# Patient Record
Sex: FEMALE | Race: WHITE | Employment: FULL TIME | URBAN - METROPOLITAN AREA
[De-identification: names, ages, dates, MRNs, and addresses within clinical notes are randomized per-mention and may not be internally consistent; named-entity substitution may affect disease eponyms.]

---

## 2018-01-19 ENCOUNTER — HOSPITAL ENCOUNTER (OUTPATIENT)
Dept: MAMMOGRAPHY | Age: 58
Discharge: HOME | End: 2018-01-19
Attending: INTERNAL MEDICINE
Payer: OTHER GOVERNMENT

## 2018-01-19 DIAGNOSIS — Z12.31 VISIT FOR SCREENING MAMMOGRAM: ICD-10-CM

## 2018-01-19 PROCEDURE — 77067 SCR MAMMO BI INCL CAD: CPT

## 2018-10-30 ENCOUNTER — APPOINTMENT (OUTPATIENT)
Dept: CT IMAGING | Age: 58
End: 2018-10-30
Attending: EMERGENCY MEDICINE
Payer: OTHER GOVERNMENT

## 2018-10-30 ENCOUNTER — HOSPITAL ENCOUNTER (EMERGENCY)
Age: 58
Discharge: HOME | End: 2018-10-30
Attending: EMERGENCY MEDICINE
Payer: OTHER GOVERNMENT

## 2018-10-30 VITALS
WEIGHT: 153.22 LBS | RESPIRATION RATE: 16 BRPM | SYSTOLIC BLOOD PRESSURE: 140 MMHG | DIASTOLIC BLOOD PRESSURE: 72 MMHG | HEART RATE: 60 BPM | HEIGHT: 65 IN | TEMPERATURE: 98 F | OXYGEN SATURATION: 97 % | BODY MASS INDEX: 25.53 KG/M2

## 2018-10-30 DIAGNOSIS — R42 VERTIGO: Primary | ICD-10-CM

## 2018-10-30 LAB
ALBUMIN SERPL-MCNC: 3.9 G/DL (ref 3.5–5)
ALBUMIN/GLOB SERPL: 1.1 {RATIO} (ref 1.1–2.2)
ALP SERPL-CCNC: 92 U/L (ref 45–117)
ALT SERPL-CCNC: 24 U/L (ref 12–78)
ANION GAP SERPL CALC-SCNC: 12 MMOL/L (ref 5–15)
AST SERPL-CCNC: 20 U/L (ref 15–37)
ATRIAL RATE: 60 BPM
BASOPHILS # BLD: 0 K/UL (ref 0–0.1)
BASOPHILS NFR BLD: 1 % (ref 0–1)
BILIRUB SERPL-MCNC: 1 MG/DL (ref 0.2–1)
BUN SERPL-MCNC: 18 MG/DL (ref 6–20)
BUN/CREAT SERPL: 24 (ref 12–20)
CALCIUM SERPL-MCNC: 9.2 MG/DL (ref 8.5–10.1)
CALCULATED P AXIS, ECG09: 31 DEGREES
CALCULATED R AXIS, ECG10: 27 DEGREES
CALCULATED T AXIS, ECG11: 25 DEGREES
CHLORIDE SERPL-SCNC: 107 MMOL/L (ref 97–108)
CO2 SERPL-SCNC: 22 MMOL/L (ref 21–32)
COMMENT, HOLDF: NORMAL
CREAT SERPL-MCNC: 0.75 MG/DL (ref 0.55–1.02)
DIAGNOSIS, 93000: NORMAL
DIFFERENTIAL METHOD BLD: NORMAL
EOSINOPHIL # BLD: 0.2 K/UL (ref 0–0.4)
EOSINOPHIL NFR BLD: 4 % (ref 0–7)
ERYTHROCYTE [DISTWIDTH] IN BLOOD BY AUTOMATED COUNT: 11.9 % (ref 11.5–14.5)
GLOBULIN SER CALC-MCNC: 3.7 G/DL (ref 2–4)
GLUCOSE SERPL-MCNC: 143 MG/DL (ref 65–100)
HCT VFR BLD AUTO: 38.1 % (ref 35–47)
HGB BLD-MCNC: 12.4 G/DL (ref 11.5–16)
IMM GRANULOCYTES # BLD: 0 K/UL (ref 0–0.04)
IMM GRANULOCYTES NFR BLD AUTO: 0 % (ref 0–0.5)
LYMPHOCYTES # BLD: 1.6 K/UL (ref 0.8–3.5)
LYMPHOCYTES NFR BLD: 32 % (ref 12–49)
MCH RBC QN AUTO: 31.2 PG (ref 26–34)
MCHC RBC AUTO-ENTMCNC: 32.5 G/DL (ref 30–36.5)
MCV RBC AUTO: 96 FL (ref 80–99)
MONOCYTES # BLD: 0.5 K/UL (ref 0–1)
MONOCYTES NFR BLD: 10 % (ref 5–13)
NEUTS SEG # BLD: 2.8 K/UL (ref 1.8–8)
NEUTS SEG NFR BLD: 54 % (ref 32–75)
NRBC # BLD: 0 K/UL (ref 0–0.01)
NRBC BLD-RTO: 0 PER 100 WBC
P-R INTERVAL, ECG05: 136 MS
PLATELET # BLD AUTO: 288 K/UL (ref 150–400)
PMV BLD AUTO: 9.5 FL (ref 8.9–12.9)
POTASSIUM SERPL-SCNC: 3.8 MMOL/L (ref 3.5–5.1)
PROT SERPL-MCNC: 7.6 G/DL (ref 6.4–8.2)
Q-T INTERVAL, ECG07: 440 MS
QRS DURATION, ECG06: 82 MS
QTC CALCULATION (BEZET), ECG08: 440 MS
RBC # BLD AUTO: 3.97 M/UL (ref 3.8–5.2)
SAMPLES BEING HELD,HOLD: NORMAL
SODIUM SERPL-SCNC: 141 MMOL/L (ref 136–145)
VENTRICULAR RATE, ECG03: 60 BPM
WBC # BLD AUTO: 5.2 K/UL (ref 3.6–11)

## 2018-10-30 PROCEDURE — 70496 CT ANGIOGRAPHY HEAD: CPT

## 2018-10-30 PROCEDURE — 36415 COLL VENOUS BLD VENIPUNCTURE: CPT | Performed by: EMERGENCY MEDICINE

## 2018-10-30 PROCEDURE — 93005 ELECTROCARDIOGRAM TRACING: CPT

## 2018-10-30 PROCEDURE — 70450 CT HEAD/BRAIN W/O DYE: CPT

## 2018-10-30 PROCEDURE — 70498 CT ANGIOGRAPHY NECK: CPT

## 2018-10-30 PROCEDURE — 96374 THER/PROPH/DIAG INJ IV PUSH: CPT

## 2018-10-30 PROCEDURE — 96361 HYDRATE IV INFUSION ADD-ON: CPT

## 2018-10-30 PROCEDURE — 74011250636 HC RX REV CODE- 250/636: Performed by: EMERGENCY MEDICINE

## 2018-10-30 PROCEDURE — 74011636320 HC RX REV CODE- 636/320: Performed by: EMERGENCY MEDICINE

## 2018-10-30 PROCEDURE — 80053 COMPREHEN METABOLIC PANEL: CPT | Performed by: EMERGENCY MEDICINE

## 2018-10-30 PROCEDURE — 74011000258 HC RX REV CODE- 258: Performed by: EMERGENCY MEDICINE

## 2018-10-30 PROCEDURE — 85025 COMPLETE CBC W/AUTO DIFF WBC: CPT | Performed by: EMERGENCY MEDICINE

## 2018-10-30 PROCEDURE — 99285 EMERGENCY DEPT VISIT HI MDM: CPT

## 2018-10-30 RX ORDER — ONDANSETRON 2 MG/ML
8 INJECTION INTRAMUSCULAR; INTRAVENOUS
Status: COMPLETED | OUTPATIENT
Start: 2018-10-30 | End: 2018-10-30

## 2018-10-30 RX ORDER — SODIUM CHLORIDE 0.9 % (FLUSH) 0.9 %
10 SYRINGE (ML) INJECTION
Status: COMPLETED | OUTPATIENT
Start: 2018-10-30 | End: 2018-10-30

## 2018-10-30 RX ORDER — MECLIZINE HYDROCHLORIDE 25 MG/1
25 TABLET ORAL
Qty: 12 TAB | Refills: 0 | Status: SHIPPED | OUTPATIENT
Start: 2018-10-30 | End: 2018-11-09

## 2018-10-30 RX ORDER — MECLIZINE HCL 12.5 MG 12.5 MG/1
25 TABLET ORAL
Status: COMPLETED | OUTPATIENT
Start: 2018-10-30 | End: 2018-10-30

## 2018-10-30 RX ADMIN — ONDANSETRON 8 MG: 2 INJECTION INTRAMUSCULAR; INTRAVENOUS at 05:28

## 2018-10-30 RX ADMIN — SODIUM CHLORIDE 100 ML: 900 INJECTION, SOLUTION INTRAVENOUS at 08:58

## 2018-10-30 RX ADMIN — IOPAMIDOL 100 ML: 755 INJECTION, SOLUTION INTRAVENOUS at 08:58

## 2018-10-30 RX ADMIN — Medication 10 ML: at 08:58

## 2018-10-30 RX ADMIN — SODIUM CHLORIDE 1000 ML: 900 INJECTION, SOLUTION INTRAVENOUS at 05:28

## 2018-10-30 RX ADMIN — MECLIZINE 25 MG: 12.5 TABLET ORAL at 05:28

## 2018-10-30 NOTE — ED PROVIDER NOTES
HPI 63year old female with htn, presents with dizziness (room spinning) with nausea since yesterday morning. Lasted most of the day, worse moving head, no focal weakness or numbness. Symptoms eventually subsided and went to bed ok. Woke up this morning feeling the same. No chest pain or sob. No fever. Does have some sinus symptoms. No ear pain, ringing or hearing loss. Never had before. No change in speech or vision. Also has with pain in the back of her neck- no injury, no strain. Currently 3/10 pain. No family history of stroke. Non smoker Past Medical History:  
Diagnosis Date  Arthritis  Hypertension Past Surgical History:  
Procedure Laterality Date  HX ORTHOPAEDIC    
 plantar tendon release History reviewed. No pertinent family history. Social History Socioeconomic History  Marital status: SINGLE Spouse name: Not on file  Number of children: Not on file  Years of education: Not on file  Highest education level: Not on file Social Needs  Financial resource strain: Not on file  Food insecurity - worry: Not on file  Food insecurity - inability: Not on file  Transportation needs - medical: Not on file  Transportation needs - non-medical: Not on file Occupational History  Not on file Tobacco Use  Smoking status: Never Smoker  Smokeless tobacco: Never Used Substance and Sexual Activity  Alcohol use: Yes Alcohol/week: 8.4 oz Types: 14 Glasses of wine per week Frequency: Never  Drug use: Not on file  Sexual activity: Not on file Other Topics Concern  Not on file Social History Narrative  Not on file ALLERGIES: Codeine; Percocet [oxycodone-acetaminophen]; and Percodan [oxycodone hcl-oxycodone-asa] Review of Systems Constitutional: Negative for fever. HENT: Positive for sinus pressure. Negative for congestion and tinnitus. Eyes: Negative for visual disturbance. Respiratory: Positive for cough. Cardiovascular: Negative for chest pain and leg swelling. Gastrointestinal: Negative for abdominal pain. Genitourinary: Negative for dysuria. Musculoskeletal: Positive for neck pain. Skin: Negative for rash. Neurological: Positive for dizziness. Negative for facial asymmetry, speech difficulty, weakness, numbness and headaches. Psychiatric/Behavioral: Negative for dysphoric mood. Vitals:  
 10/30/18 0454 BP: 153/88 Pulse: 69 Resp: 16 Temp: 97.3 °F (36.3 °C) SpO2: 96% Weight: 69.5 kg (153 lb 3.5 oz) Height: 5' 5\" (1.651 m) Physical Exam  
Constitutional: She is oriented to person, place, and time. She appears well-developed and well-nourished. No distress. HENT:  
Head: Normocephalic and atraumatic. Mouth/Throat: No oropharyngeal exudate. Eyes: Pupils are equal, round, and reactive to light. Right eye exhibits no discharge. Left eye exhibits no discharge. No scleral icterus. Neck: Normal range of motion. Neck supple. No JVD present. Cardiovascular: Normal rate, regular rhythm and normal heart sounds. No murmur heard. Pulmonary/Chest: Effort normal and breath sounds normal. No stridor. No respiratory distress. She has no wheezes. She has no rales. She exhibits no tenderness. Abdominal: Soft. Bowel sounds are normal. She exhibits no distension and no mass. There is no tenderness. There is no rebound and no guarding. Musculoskeletal: Normal range of motion. Neurological: She is oriented to person, place, and time. Skin: Skin is warm and dry. Capillary refill takes less than 2 seconds. No rash noted. Psychiatric: She has a normal mood and affect. Her behavior is normal. Judgment and thought content normal.  
  
 
MDM Procedures ED EKG interpretation: 
Rhythm: normal sinus rhythm; and regular .  Rate (approx.): 60; Axis: normal; P wave: normal; QRS interval: normal ; ST/T wave: non-specific changes; This EKG was interpreted by Abundio Anaya MD,ED Provider. head cT neg. Labs ok. Starting to feel better with meclizine. Will do CTA head/neck given associated neck. Care signed over to Dr. Giles Escobedo at change of shift. CTA pending. If negative, anticipate discharge with vertigo instructions and follow up with ENT, RX for meclizine.

## 2018-10-30 NOTE — ED TRIAGE NOTES
Patient comes in from home. Reports yesterday waking up around 0400 with dizziness and nausea. States yesterday she was nauseated all day, but the dizziness improved. Went to bed feeling well. Woke up with same symptoms today.

## 2018-10-30 NOTE — ED NOTES
8:13 AM 
Change of shift. Care of patient taken over from Dr. Olive Butcher to Dr. Edwin Zhao; H&P reviewed, bedside handoff complete. Awaiting CTA result. 11:14 AM 
Confirmed with Dr. Chiqui Badillo that prelim result is for CTA of head and neck. Will discharge home with antivert as planned by Dr. Olive Butcher.

## 2018-10-30 NOTE — DISCHARGE INSTRUCTIONS
Work up was reassuring in the ED. I have prescribed Meclizine for dizziness. Please follow up with the ENT specialist for further evaluation and treatment of your symptoms. Return to the ED if your symptoms worsen, especially if you develop an associated neurologic symptoms such as change in speech, vision, focal weakness/numbness     Vertigo: Care Instructions  Your Care Instructions    Vertigo is the feeling that you or your surroundings are moving when there is no actual movement. It is often described as a feeling of spinning, whirling, falling, or tilting. Vertigo may make you vomit or feel nauseated. You may have trouble standing or walking and may lose your balance. Vertigo is often related to an inner ear problem, but it can have other more serious causes. If vertigo continues, you may need more tests to find its cause. Follow-up care is a key part of your treatment and safety. Be sure to make and go to all appointments, and call your doctor if you are having problems. It's also a good idea to know your test results and keep a list of the medicines you take. How can you care for yourself at home? · Do not lie flat on your back. Prop yourself up slightly. This may reduce the spinning feeling. Keep your eyes open. · Move slowly so that you do not fall. · If your doctor recommends medicine, take it exactly as directed. · Do not drive while you are having vertigo. Certain exercises, called Greene-Daroff exercises, can help decrease vertigo. To do Greene-Daroff exercises:  · Sit on the edge of a bed or sofa and quickly lie down on the side that causes the worst vertigo. Lie on your side with your ear down. · Stay in this position for at least 30 seconds or until the vertigo goes away. · Sit up. If this causes vertigo, wait for it to stop. · Repeat the procedure on the other side. · Repeat this 10 times. Do these exercises 2 times a day until the vertigo is gone.   When should you call for help?  Call 911 anytime you think you may need emergency care. For example, call if:    · You passed out (lost consciousness).     · You have symptoms of a stroke. These may include:  ? Sudden numbness, tingling, weakness, or loss of movement in your face, arm, or leg, especially on only one side of your body. ? Sudden vision changes. ? Sudden trouble speaking. ? Sudden confusion or trouble understanding simple statements. ? Sudden problems with walking or balance. ? A sudden, severe headache that is different from past headaches.    Call your doctor now or seek immediate medical care if:    · Vertigo occurs with a fever, a headache, or ringing in your ears.     · You have new or increased nausea and vomiting.    Watch closely for changes in your health, and be sure to contact your doctor if:    · Vertigo gets worse or happens more often.     · Vertigo has not gotten better after 2 weeks. Where can you learn more? Go to http://josé-catarino.info/. Enter A959 in the search box to learn more about \"Vertigo: Care Instructions. \"  Current as of: March 28, 2018  Content Version: 11.8  © 5655-2442 [email or web address]. Care instructions adapted under license by [employer] (which disclaims liability or warranty for this information). If you have questions about a medical condition or this instruction, always ask your healthcare professional. [address] any warranty or liability for your use of this information.

## 2018-10-30 NOTE — ED NOTES
CTA's prelim negative. D/c home. 11:25 AM 
Patient's results have been reviewed with them. Patient and/or family have verbally conveyed their understanding and agreement of the patient's signs, symptoms, diagnosis, treatment and prognosis and additionally agree to follow up as recommended or return to the Emergency Room should their condition change prior to follow-up. Discharge instructions have also been provided to the patient with some educational information regarding their diagnosis as well a list of reasons why they would want to return to the ER prior to their follow-up appointment should their condition change. Recent Results (from the past 24 hour(s)) EKG, 12 LEAD, INITIAL Collection Time: 10/30/18  5:05 AM  
Result Value Ref Range Ventricular Rate 60 BPM  
 Atrial Rate 60 BPM  
 P-R Interval 136 ms QRS Duration 82 ms Q-T Interval 440 ms QTC Calculation (Bezet) 440 ms Calculated P Axis 31 degrees Calculated R Axis 27 degrees Calculated T Axis 25 degrees Diagnosis Normal sinus rhythm with sinus arrhythmia Septal infarct , age undetermined No previous ECGs available Confirmed by Yossi Armendariz M.D., Lupe Abrams ([id number]) on 10/30/2018 7:41:26 AM 
  
SAMPLES BEING HELD Collection Time: 10/30/18  5:18 AM  
Result Value Ref Range SAMPLES BEING HELD 1RED,1BLUE   
 COMMENT Add-on orders for these samples will be processed based on acceptable specimen integrity and analyte stability, which may vary by analyte. CBC WITH AUTOMATED DIFF Collection Time: 10/30/18  5:18 AM  
Result Value Ref Range WBC 5.2 3.6 - 11.0 K/uL  
 RBC 3.97 3.80 - 5.20 M/uL  
 HGB 12.4 11.5 - 16.0 g/dL HCT 38.1 35.0 - 47.0 % MCV 96.0 80.0 - 99.0 FL  
 MCH 31.2 26.0 - 34.0 PG  
 MCHC 32.5 30.0 - 36.5 g/dL  
 RDW 11.9 11.5 - 14.5 % PLATELET 637 200 - 920 K/uL MPV 9.5 8.9 - 12.9 FL  
 NRBC 0.0 0  WBC ABSOLUTE NRBC 0.00 0.00 - 0.01 K/uL NEUTROPHILS 54 32 - 75 % LYMPHOCYTES 32 12 - 49 % MONOCYTES 10 5 - 13 % EOSINOPHILS 4 0 - 7 % BASOPHILS 1 0 - 1 % IMMATURE GRANULOCYTES 0 0.0 - 0.5 % ABS. NEUTROPHILS 2.8 1.8 - 8.0 K/UL  
 ABS. LYMPHOCYTES 1.6 0.8 - 3.5 K/UL  
 ABS. MONOCYTES 0.5 0.0 - 1.0 K/UL  
 ABS. EOSINOPHILS 0.2 0.0 - 0.4 K/UL  
 ABS. BASOPHILS 0.0 0.0 - 0.1 K/UL  
 ABS. IMM. GRANS. 0.0 0.00 - 0.04 K/UL  
 DF AUTOMATED METABOLIC PANEL, COMPREHENSIVE Collection Time: 10/30/18  5:18 AM  
Result Value Ref Range Sodium 141 136 - 145 mmol/L Potassium 3.8 3.5 - 5.1 mmol/L Chloride 107 97 - 108 mmol/L  
 CO2 22 21 - 32 mmol/L Anion gap 12 5 - 15 mmol/L Glucose 143 (H) 65 - 100 mg/dL BUN 18 6 - 20 MG/DL Creatinine 0.75 0.55 - 1.02 MG/DL  
 BUN/Creatinine ratio 24 (H) 12 - 20 GFR est AA >60 >60 ml/min/1.73m2 GFR est non-AA >60 >60 ml/min/1.73m2 Calcium 9.2 8.5 - 10.1 MG/DL Bilirubin, total 1.0 0.2 - 1.0 MG/DL  
 ALT (SGPT) 24 12 - 78 U/L  
 AST (SGOT) 20 15 - 37 U/L Alk. phosphatase 92 45 - 117 U/L Protein, total 7.6 6.4 - 8.2 g/dL Albumin 3.9 3.5 - 5.0 g/dL Globulin 3.7 2.0 - 4.0 g/dL A-G Ratio 1.1 1.1 - 2.2 Ct Head Wo Cont Result Date: 10/30/2018 EXAM:  CT HEAD WO CONT INDICATION:   dizzy COMPARISON: None. CONTRAST:  None. TECHNIQUE: Unenhanced CT of the head was performed using 5 mm images. Brain and bone windows were generated. CT dose reduction was achieved through use of a standardized protocol tailored for this examination and automatic exposure control for dose modulation. FINDINGS: The ventricles and sulci are normal in size, shape and configuration and midline. There is no significant white matter disease. There is no intracranial hemorrhage, extra-axial collection, mass, mass effect or midline shift. The basilar cisterns are open. No acute infarct is identified. The bone windows demonstrate no abnormalities.  The visualized portions of the paranasal sinuses and mastoid air cells are clear. IMPRESSION: Unremarkable CT of the head. Cta Head Result Date: 10/30/2018 *PRELIMINARY REPORT* No large vessel occlusion or aneurysm Preliminary report was provided by Dr. Jeff Cueva, the on-call radiologist, at 10:37. Final report to follow. *END PRELIMINARY REPORT*

## 2018-10-30 NOTE — ED NOTES
Bedside and Verbal shift change report given to [address], RN (oncoming nurse) by [address], RN (offgoing nurse). Report included the following information SBAR, ED Summary, MAR and Recent Results.

## 2019-03-04 ENCOUNTER — HOSPITAL ENCOUNTER (OUTPATIENT)
Dept: MAMMOGRAPHY | Age: 59
Discharge: HOME | End: 2019-03-04
Attending: INTERNAL MEDICINE
Payer: OTHER GOVERNMENT

## 2019-03-04 DIAGNOSIS — Z12.31 VISIT FOR SCREENING MAMMOGRAM: ICD-10-CM

## 2019-03-04 PROCEDURE — 77067 SCR MAMMO BI INCL CAD: CPT

## 2019-05-22 ENCOUNTER — HOSPITAL ENCOUNTER (EMERGENCY)
Age: 59
Discharge: HOME | End: 2019-05-22
Attending: STUDENT IN AN ORGANIZED HEALTH CARE EDUCATION/TRAINING PROGRAM | Admitting: STUDENT IN AN ORGANIZED HEALTH CARE EDUCATION/TRAINING PROGRAM
Payer: OTHER GOVERNMENT

## 2019-05-22 ENCOUNTER — APPOINTMENT (OUTPATIENT)
Dept: CT IMAGING | Age: 59
End: 2019-05-22
Attending: STUDENT IN AN ORGANIZED HEALTH CARE EDUCATION/TRAINING PROGRAM
Payer: OTHER GOVERNMENT

## 2019-05-22 VITALS
HEIGHT: 65 IN | HEART RATE: 87 BPM | DIASTOLIC BLOOD PRESSURE: 50 MMHG | RESPIRATION RATE: 17 BRPM | SYSTOLIC BLOOD PRESSURE: 120 MMHG | OXYGEN SATURATION: 98 % | BODY MASS INDEX: 24.99 KG/M2 | TEMPERATURE: 98.3 F | WEIGHT: 150 LBS

## 2019-05-22 DIAGNOSIS — N23 RENAL COLIC: Primary | ICD-10-CM

## 2019-05-22 LAB
ALBUMIN SERPL-MCNC: 3.5 G/DL (ref 3.5–5)
ALBUMIN/GLOB SERPL: 0.9 {RATIO} (ref 1.1–2.2)
ALP SERPL-CCNC: 79 U/L (ref 45–117)
ALT SERPL-CCNC: 29 U/L (ref 12–78)
ANION GAP SERPL CALC-SCNC: 9 MMOL/L (ref 5–15)
APPEARANCE UR: ABNORMAL
AST SERPL-CCNC: 30 U/L (ref 15–37)
BACTERIA URNS QL MICRO: NEGATIVE /HPF
BASOPHILS # BLD: 0 K/UL (ref 0–0.1)
BASOPHILS NFR BLD: 0 % (ref 0–1)
BILIRUB SERPL-MCNC: 1.7 MG/DL (ref 0.2–1)
BILIRUB UR QL: NEGATIVE
BUN SERPL-MCNC: 18 MG/DL (ref 6–20)
BUN/CREAT SERPL: 16 (ref 12–20)
CALCIUM SERPL-MCNC: 9.2 MG/DL (ref 8.5–10.1)
CHLORIDE SERPL-SCNC: 106 MMOL/L (ref 97–108)
CO2 SERPL-SCNC: 24 MMOL/L (ref 21–32)
COLOR UR: ABNORMAL
COMMENT, HOLDF: NORMAL
CREAT SERPL-MCNC: 1.11 MG/DL (ref 0.55–1.02)
DIFFERENTIAL METHOD BLD: ABNORMAL
EOSINOPHIL # BLD: 0.2 K/UL (ref 0–0.4)
EOSINOPHIL NFR BLD: 1 % (ref 0–7)
EPITH CASTS URNS QL MICRO: ABNORMAL /LPF
ERYTHROCYTE [DISTWIDTH] IN BLOOD BY AUTOMATED COUNT: 11.7 % (ref 11.5–14.5)
GLOBULIN SER CALC-MCNC: 3.8 G/DL (ref 2–4)
GLUCOSE SERPL-MCNC: 143 MG/DL (ref 65–100)
GLUCOSE UR STRIP.AUTO-MCNC: NEGATIVE MG/DL
HCT VFR BLD AUTO: 36.5 % (ref 35–47)
HGB BLD-MCNC: 11.8 G/DL (ref 11.5–16)
HGB UR QL STRIP: ABNORMAL
HYALINE CASTS URNS QL MICRO: ABNORMAL /LPF (ref 0–5)
IMM GRANULOCYTES # BLD AUTO: 0 K/UL
IMM GRANULOCYTES NFR BLD AUTO: 0 %
KETONES UR QL STRIP.AUTO: NEGATIVE MG/DL
LEUKOCYTE ESTERASE UR QL STRIP.AUTO: ABNORMAL
LIPASE SERPL-CCNC: 90 U/L (ref 73–393)
LYMPHOCYTES # BLD: 0.4 K/UL (ref 0.8–3.5)
LYMPHOCYTES NFR BLD: 2 % (ref 12–49)
MCH RBC QN AUTO: 30.1 PG (ref 26–34)
MCHC RBC AUTO-ENTMCNC: 32.3 G/DL (ref 30–36.5)
MCV RBC AUTO: 93.1 FL (ref 80–99)
MONOCYTES # BLD: 0.2 K/UL (ref 0–1)
MONOCYTES NFR BLD: 1 % (ref 5–13)
NEUTS BAND NFR BLD MANUAL: 11 % (ref 0–6)
NEUTS SEG # BLD: 19.9 K/UL (ref 1.8–8)
NEUTS SEG NFR BLD: 85 % (ref 32–75)
NITRITE UR QL STRIP.AUTO: NEGATIVE
NRBC # BLD: 0 K/UL (ref 0–0.01)
NRBC BLD-RTO: 0 PER 100 WBC
PH UR STRIP: 6 [PH] (ref 5–8)
PLATELET # BLD AUTO: 265 K/UL (ref 150–400)
PLATELET COMMENTS,PCOM: ABNORMAL
PMV BLD AUTO: 9.1 FL (ref 8.9–12.9)
POTASSIUM SERPL-SCNC: 3.6 MMOL/L (ref 3.5–5.1)
PROT SERPL-MCNC: 7.3 G/DL (ref 6.4–8.2)
PROT UR STRIP-MCNC: 30 MG/DL
RBC # BLD AUTO: 3.92 M/UL (ref 3.8–5.2)
RBC #/AREA URNS HPF: ABNORMAL /HPF (ref 0–5)
RBC MORPH BLD: ABNORMAL
SAMPLES BEING HELD,HOLD: NORMAL
SODIUM SERPL-SCNC: 139 MMOL/L (ref 136–145)
SP GR UR REFRACTOMETRY: 1.03 (ref 1–1.03)
UR CULT HOLD, URHOLD: NORMAL
UROBILINOGEN UR QL STRIP.AUTO: 0.2 EU/DL (ref 0.2–1)
WBC # BLD AUTO: 20.7 K/UL (ref 3.6–11)
WBC URNS QL MICRO: >100 /HPF (ref 0–4)

## 2019-05-22 PROCEDURE — 74011250636 HC RX REV CODE- 250/636: Performed by: STUDENT IN AN ORGANIZED HEALTH CARE EDUCATION/TRAINING PROGRAM

## 2019-05-22 PROCEDURE — 36415 COLL VENOUS BLD VENIPUNCTURE: CPT

## 2019-05-22 PROCEDURE — 96375 TX/PRO/DX INJ NEW DRUG ADDON: CPT

## 2019-05-22 PROCEDURE — 74176 CT ABD & PELVIS W/O CONTRAST: CPT

## 2019-05-22 PROCEDURE — 87086 URINE CULTURE/COLONY COUNT: CPT

## 2019-05-22 PROCEDURE — 81001 URINALYSIS AUTO W/SCOPE: CPT

## 2019-05-22 PROCEDURE — 85025 COMPLETE CBC W/AUTO DIFF WBC: CPT

## 2019-05-22 PROCEDURE — 87077 CULTURE AEROBIC IDENTIFY: CPT

## 2019-05-22 PROCEDURE — 80053 COMPREHEN METABOLIC PANEL: CPT

## 2019-05-22 PROCEDURE — 87186 SC STD MICRODIL/AGAR DIL: CPT

## 2019-05-22 PROCEDURE — 83690 ASSAY OF LIPASE: CPT

## 2019-05-22 PROCEDURE — 96365 THER/PROPH/DIAG IV INF INIT: CPT

## 2019-05-22 PROCEDURE — 74011000258 HC RX REV CODE- 258: Performed by: STUDENT IN AN ORGANIZED HEALTH CARE EDUCATION/TRAINING PROGRAM

## 2019-05-22 PROCEDURE — 99283 EMERGENCY DEPT VISIT LOW MDM: CPT

## 2019-05-22 PROCEDURE — 96361 HYDRATE IV INFUSION ADD-ON: CPT

## 2019-05-22 RX ORDER — HYDROCODONE BITARTRATE AND ACETAMINOPHEN 5; 300 MG/1; MG/1
1 TABLET ORAL
Qty: 20 TAB | Refills: 0 | Status: SHIPPED | OUTPATIENT
Start: 2019-05-22 | End: 2019-05-27

## 2019-05-22 RX ORDER — CEPHALEXIN 500 MG/1
500 CAPSULE ORAL 4 TIMES DAILY
Qty: 28 CAP | Refills: 0 | Status: SHIPPED | OUTPATIENT
Start: 2019-05-22 | End: 2019-05-27

## 2019-05-22 RX ORDER — ONDANSETRON 2 MG/ML
4 INJECTION INTRAMUSCULAR; INTRAVENOUS
Status: COMPLETED | OUTPATIENT
Start: 2019-05-22 | End: 2019-05-22

## 2019-05-22 RX ORDER — ONDANSETRON 4 MG/1
4 TABLET, ORALLY DISINTEGRATING ORAL
Qty: 12 TAB | Refills: 0 | Status: SHIPPED | OUTPATIENT
Start: 2019-05-22

## 2019-05-22 RX ORDER — NAPROXEN 500 MG/1
500 TABLET ORAL 2 TIMES DAILY WITH MEALS
Qty: 20 TAB | Refills: 0 | Status: SHIPPED | OUTPATIENT
Start: 2019-05-22 | End: 2019-05-27

## 2019-05-22 RX ORDER — TAMSULOSIN HYDROCHLORIDE 0.4 MG/1
0.4 CAPSULE ORAL DAILY
Qty: 7 CAP | Refills: 0 | Status: SHIPPED | OUTPATIENT
Start: 2019-05-22 | End: 2019-05-29

## 2019-05-22 RX ORDER — KETOROLAC TROMETHAMINE 30 MG/ML
30 INJECTION, SOLUTION INTRAMUSCULAR; INTRAVENOUS
Status: COMPLETED | OUTPATIENT
Start: 2019-05-22 | End: 2019-05-22

## 2019-05-22 RX ADMIN — ONDANSETRON 4 MG: 2 INJECTION INTRAMUSCULAR; INTRAVENOUS at 05:39

## 2019-05-22 RX ADMIN — SODIUM CHLORIDE 1000 ML: 900 INJECTION, SOLUTION INTRAVENOUS at 05:41

## 2019-05-22 RX ADMIN — CEFTRIAXONE 1 G: 1 INJECTION, POWDER, FOR SOLUTION INTRAMUSCULAR; INTRAVENOUS at 06:44

## 2019-05-22 RX ADMIN — KETOROLAC TROMETHAMINE 30 MG: 30 INJECTION, SOLUTION INTRAMUSCULAR at 05:38

## 2019-05-22 NOTE — ED NOTES
6569:  MD reviewed discharge instructions and options with patient and patient verbalized understanding. RN reviewed discharge instructions using teachback method. Pt ambulated to exit without difficulty and in no signs of acute distress escorted by friend, and friend will drive home. No complaints or needs expressed at this time. Patient was counseled on medications prescribed at discharge. VSS, verbalized relief from most intense pain. Patient to call [facility] in the morning for appointment.

## 2019-05-22 NOTE — ED TRIAGE NOTES
Pt arrives from home with c/o of sharp and stabbing LEFT sided flank pain that began yesterday. Pt denies any urinary symptoms. Denies blood in urine.  +n/v has taken tylenol without relief

## 2019-05-22 NOTE — ED PROVIDER NOTES
Onset of left flank pain while walking yesterday. Denies any strenuous activity. Pain is been constant since yesterday afternoon. Multiple episodes of nonbilious emesis. Subjective chills. No measured fever. Denies any gross hematuria although has had increased urinary frequency. Pain is unaffected by movement. No previous similar symptoms. Tried Tylenol at home without relief. Past Medical History:   Diagnosis Date    Arthritis     High cholesterol     Hypertension        Past Surgical History:   Procedure Laterality Date    HX ORTHOPAEDIC      plantar tendon release         History reviewed. No pertinent family history. Social History     Socioeconomic History    Marital status: SINGLE     Spouse name: Not on file    Number of children: Not on file    Years of education: Not on file    Highest education level: Not on file   Occupational History    Not on file   Social Needs    Financial resource strain: Not on file    Food insecurity:     Worry: Not on file     Inability: Not on file    Transportation needs:     Medical: Not on file     Non-medical: Not on file   Tobacco Use    Smoking status: Never Smoker    Smokeless tobacco: Never Used   Substance and Sexual Activity    Alcohol use:  Yes     Alcohol/week: 8.4 oz     Types: 14 Glasses of wine per week     Frequency: Never    Drug use: Never    Sexual activity: Not on file   Lifestyle    Physical activity:     Days per week: Not on file     Minutes per session: Not on file    Stress: Not on file   Relationships    Social connections:     Talks on phone: Not on file     Gets together: Not on file     Attends L[employer]-day service: Not on file     Active member of club or organization: Not on file     Attends meetings of clubs or organizations: Not on file     Relationship status: Not on file    Intimate partner violence:     Fear of current or ex partner: Not on file     Emotionally abused: Not on file     Physically abused: Not on file     Forced sexual activity: Not on file   Other Topics Concern    Not on file   Social History Narrative    Not on file         ALLERGIES: Codeine; Percocet [oxycodone-acetaminophen]; and Percodan [oxycodone hcl-oxycodone-asa]    Review of Systems   Constitutional: Negative for chills and fever. Eyes: Negative for photophobia. Respiratory: Negative for shortness of breath. Cardiovascular: Negative for chest pain. Gastrointestinal: Positive for nausea and vomiting. Negative for abdominal pain. Genitourinary: Positive for flank pain. Negative for dysuria. Musculoskeletal: Negative for back pain. Neurological: Negative for light-headedness and headaches. Psychiatric/Behavioral: Negative for confusion. All other systems reviewed and are negative. Vitals:    05/22/19 0504   BP: 109/70   Pulse: 87   Resp: 17   Temp: 98.3 °F (36.8 °C)   SpO2: 98%   Weight: 68 kg (150 lb)   Height: 5' 5\" (1.651 m)            Physical Exam   Constitutional: She appears well-nourished. No distress. HENT:   Head: Normocephalic. Eyes: Pupils are equal, round, and reactive to light. Cardiovascular: Intact distal pulses. Pulmonary/Chest: Effort normal. No stridor. No respiratory distress. Abdominal: She exhibits no distension and no mass. There is no guarding. L flank ttp  Mild llq ttp     Neurological: She is alert. Skin: Skin is warm. Psychiatric: Her behavior is normal.        MDM  Number of Diagnoses or Management Options  Renal colic:   Diagnosis management comments: Jules Mcdermott is a 63 y.o. female presenting with L flank pain. Differential includes renal colic, pyelonephiritis, less likely diverticulitis, colitis, perforated viscus. Course: check ct -- apparent mid ureteral calculus. No fever or chills  Did have a significant leucocytosis, pyuria  Given a dose of ctx and dc with abx.   D/w uro on call -- rec call office in am  Dispo: dc.         Amount and/or Complexity of Data Reviewed  Tests in the radiology section of CPT®: ordered and reviewed         Procedures

## 2019-05-23 ENCOUNTER — ANESTHESIA EVENT (OUTPATIENT)
Dept: SURGERY | Age: 59
DRG: 853 | End: 2019-05-23
Payer: OTHER GOVERNMENT

## 2019-05-23 ENCOUNTER — ANESTHESIA (OUTPATIENT)
Dept: SURGERY | Age: 59
DRG: 853 | End: 2019-05-23
Payer: OTHER GOVERNMENT

## 2019-05-23 ENCOUNTER — HOSPITAL ENCOUNTER (INPATIENT)
Age: 59
LOS: 4 days | Discharge: HOME | DRG: 853 | End: 2019-05-27
Attending: EMERGENCY MEDICINE | Admitting: FAMILY MEDICINE
Payer: OTHER GOVERNMENT

## 2019-05-23 ENCOUNTER — APPOINTMENT (OUTPATIENT)
Dept: GENERAL RADIOLOGY | Age: 59
DRG: 853 | End: 2019-05-23
Attending: UROLOGY
Payer: OTHER GOVERNMENT

## 2019-05-23 DIAGNOSIS — N20.0 KIDNEY STONE: ICD-10-CM

## 2019-05-23 DIAGNOSIS — N12 PYELONEPHRITIS: ICD-10-CM

## 2019-05-23 DIAGNOSIS — R50.9 ACUTE FEBRILE ILLNESS: Primary | ICD-10-CM

## 2019-05-23 PROBLEM — N39.0 UTI (URINARY TRACT INFECTION): Status: ACTIVE | Noted: 2019-05-23

## 2019-05-23 LAB
ANION GAP SERPL CALC-SCNC: 8 MMOL/L (ref 5–15)
ATRIAL RATE: 120 BPM
BASOPHILS # BLD: 0 K/UL (ref 0–0.1)
BASOPHILS NFR BLD: 0 % (ref 0–1)
BUN SERPL-MCNC: 29 MG/DL (ref 6–20)
BUN/CREAT SERPL: 20 (ref 12–20)
CALCIUM SERPL-MCNC: 8.9 MG/DL (ref 8.5–10.1)
CALCULATED P AXIS, ECG09: 36 DEGREES
CALCULATED R AXIS, ECG10: 46 DEGREES
CALCULATED T AXIS, ECG11: -13 DEGREES
CHLORIDE SERPL-SCNC: 104 MMOL/L (ref 97–108)
CO2 SERPL-SCNC: 24 MMOL/L (ref 21–32)
COMMENT, HOLDF: NORMAL
CREAT SERPL-MCNC: 1.48 MG/DL (ref 0.55–1.02)
DIAGNOSIS, 93000: NORMAL
DIFFERENTIAL METHOD BLD: ABNORMAL
EOSINOPHIL # BLD: 0.3 K/UL (ref 0–0.4)
EOSINOPHIL NFR BLD: 2 % (ref 0–7)
ERYTHROCYTE [DISTWIDTH] IN BLOOD BY AUTOMATED COUNT: 11.9 % (ref 11.5–14.5)
GLUCOSE SERPL-MCNC: 113 MG/DL (ref 65–100)
HCT VFR BLD AUTO: 35.9 % (ref 35–47)
HGB BLD-MCNC: 11.5 G/DL (ref 11.5–16)
IMM GRANULOCYTES # BLD AUTO: 0 K/UL
IMM GRANULOCYTES NFR BLD AUTO: 0 %
LACTATE BLD-SCNC: 1.08 MMOL/L (ref 0.4–2)
LYMPHOCYTES # BLD: 0.5 K/UL (ref 0.8–3.5)
LYMPHOCYTES NFR BLD: 4 % (ref 12–49)
MCH RBC QN AUTO: 30.3 PG (ref 26–34)
MCHC RBC AUTO-ENTMCNC: 32 G/DL (ref 30–36.5)
MCV RBC AUTO: 94.5 FL (ref 80–99)
MONOCYTES # BLD: 0.3 K/UL (ref 0–1)
MONOCYTES NFR BLD: 2 % (ref 5–13)
NEUTS BAND NFR BLD MANUAL: 15 % (ref 0–6)
NEUTS SEG # BLD: 12.4 K/UL (ref 1.8–8)
NEUTS SEG NFR BLD: 77 % (ref 32–75)
NRBC # BLD: 0 K/UL (ref 0–0.01)
NRBC BLD-RTO: 0 PER 100 WBC
P-R INTERVAL, ECG05: 130 MS
PLATELET # BLD AUTO: 228 K/UL (ref 150–400)
PMV BLD AUTO: 10 FL (ref 8.9–12.9)
POTASSIUM SERPL-SCNC: 3.4 MMOL/L (ref 3.5–5.1)
Q-T INTERVAL, ECG07: 292 MS
QRS DURATION, ECG06: 76 MS
QTC CALCULATION (BEZET), ECG08: 412 MS
RBC # BLD AUTO: 3.8 M/UL (ref 3.8–5.2)
RBC MORPH BLD: ABNORMAL
SAMPLES BEING HELD,HOLD: NORMAL
SODIUM SERPL-SCNC: 136 MMOL/L (ref 136–145)
VENTRICULAR RATE, ECG03: 120 BPM
WBC # BLD AUTO: 13.5 K/UL (ref 3.6–11)
WBC MORPH BLD: ABNORMAL

## 2019-05-23 PROCEDURE — 74420 UROGRAPHY RTRGR +-KUB: CPT

## 2019-05-23 PROCEDURE — C1769 GUIDE WIRE: HCPCS | Performed by: UROLOGY

## 2019-05-23 PROCEDURE — 80048 BASIC METABOLIC PNL TOTAL CA: CPT

## 2019-05-23 PROCEDURE — 77030026438 HC STYL ET INTUB CARD -A: Performed by: NURSE ANESTHETIST, CERTIFIED REGISTERED

## 2019-05-23 PROCEDURE — 93005 ELECTROCARDIOGRAM TRACING: CPT

## 2019-05-23 PROCEDURE — 77030008684 HC TU ET CUF COVD -B: Performed by: NURSE ANESTHETIST, CERTIFIED REGISTERED

## 2019-05-23 PROCEDURE — 77030032490 HC SLV COMPR SCD KNE COVD -B: Performed by: UROLOGY

## 2019-05-23 PROCEDURE — C2617 STENT, NON-COR, TEM W/O DEL: HCPCS | Performed by: UROLOGY

## 2019-05-23 PROCEDURE — 87040 BLOOD CULTURE FOR BACTERIA: CPT

## 2019-05-23 PROCEDURE — 74011250636 HC RX REV CODE- 250/636: Performed by: ANESTHESIOLOGY

## 2019-05-23 PROCEDURE — 3E03329 INTRODUCTION OF OTHER ANTI-INFECTIVE INTO PERIPHERAL VEIN, PERCUTANEOUS APPROACH: ICD-10-PCS | Performed by: FAMILY MEDICINE

## 2019-05-23 PROCEDURE — BT1F1ZZ FLUOROSCOPY OF LEFT KIDNEY, URETER AND BLADDER USING LOW OSMOLAR CONTRAST: ICD-10-PCS | Performed by: UROLOGY

## 2019-05-23 PROCEDURE — 77030018832 HC SOL IRR H20 ICUM -A: Performed by: UROLOGY

## 2019-05-23 PROCEDURE — 77030019927 HC TBNG IRR CYSTO BAXT -A: Performed by: UROLOGY

## 2019-05-23 PROCEDURE — 76060000032 HC ANESTHESIA 0.5 TO 1 HR: Performed by: UROLOGY

## 2019-05-23 PROCEDURE — 74011000250 HC RX REV CODE- 250

## 2019-05-23 PROCEDURE — 99284 EMERGENCY DEPT VISIT MOD MDM: CPT

## 2019-05-23 PROCEDURE — 74011250636 HC RX REV CODE- 250/636: Performed by: PHYSICIAN ASSISTANT

## 2019-05-23 PROCEDURE — 74011250636 HC RX REV CODE- 250/636: Performed by: EMERGENCY MEDICINE

## 2019-05-23 PROCEDURE — 0T778DZ DILATION OF LEFT URETER WITH INTRALUMINAL DEVICE, VIA NATURAL OR ARTIFICIAL OPENING ENDOSCOPIC: ICD-10-PCS | Performed by: UROLOGY

## 2019-05-23 PROCEDURE — 77030034696 HC CATH URETH FOL 2W BARD -A: Performed by: UROLOGY

## 2019-05-23 PROCEDURE — 65660000000 HC RM CCU STEPDOWN

## 2019-05-23 PROCEDURE — C1758 CATHETER, URETERAL: HCPCS | Performed by: UROLOGY

## 2019-05-23 PROCEDURE — 76210000006 HC OR PH I REC 0.5 TO 1 HR: Performed by: UROLOGY

## 2019-05-23 PROCEDURE — 74011636320 HC RX REV CODE- 636/320: Performed by: UROLOGY

## 2019-05-23 PROCEDURE — 96365 THER/PROPH/DIAG IV INF INIT: CPT

## 2019-05-23 PROCEDURE — 77030010545: Performed by: UROLOGY

## 2019-05-23 PROCEDURE — 74011250636 HC RX REV CODE- 250/636

## 2019-05-23 PROCEDURE — 76010000138 HC OR TIME 0.5 TO 1 HR: Performed by: UROLOGY

## 2019-05-23 PROCEDURE — 83605 ASSAY OF LACTIC ACID: CPT

## 2019-05-23 PROCEDURE — 74011000258 HC RX REV CODE- 258: Performed by: EMERGENCY MEDICINE

## 2019-05-23 PROCEDURE — 85025 COMPLETE CBC W/AUTO DIFF WBC: CPT

## 2019-05-23 PROCEDURE — 74011250636 HC RX REV CODE- 250/636: Performed by: FAMILY MEDICINE

## 2019-05-23 PROCEDURE — 96375 TX/PRO/DX INJ NEW DRUG ADDON: CPT

## 2019-05-23 PROCEDURE — 36415 COLL VENOUS BLD VENIPUNCTURE: CPT

## 2019-05-23 RX ORDER — ONDANSETRON 2 MG/ML
4 INJECTION INTRAMUSCULAR; INTRAVENOUS AS NEEDED
Status: DISCONTINUED | OUTPATIENT
Start: 2019-05-23 | End: 2019-05-23

## 2019-05-23 RX ORDER — MIDAZOLAM HYDROCHLORIDE 1 MG/ML
INJECTION, SOLUTION INTRAMUSCULAR; INTRAVENOUS AS NEEDED
Status: DISCONTINUED | OUTPATIENT
Start: 2019-05-23 | End: 2019-05-23 | Stop reason: HOSPADM

## 2019-05-23 RX ORDER — SODIUM CHLORIDE, SODIUM LACTATE, POTASSIUM CHLORIDE, CALCIUM CHLORIDE 600; 310; 30; 20 MG/100ML; MG/100ML; MG/100ML; MG/100ML
125 INJECTION, SOLUTION INTRAVENOUS CONTINUOUS
Status: DISCONTINUED | OUTPATIENT
Start: 2019-05-23 | End: 2019-05-23 | Stop reason: HOSPADM

## 2019-05-23 RX ORDER — FENTANYL CITRATE 50 UG/ML
25 INJECTION, SOLUTION INTRAMUSCULAR; INTRAVENOUS
Status: DISCONTINUED | OUTPATIENT
Start: 2019-05-23 | End: 2019-05-23

## 2019-05-23 RX ORDER — PROPOFOL 10 MG/ML
INJECTION, EMULSION INTRAVENOUS AS NEEDED
Status: DISCONTINUED | OUTPATIENT
Start: 2019-05-23 | End: 2019-05-23 | Stop reason: HOSPADM

## 2019-05-23 RX ORDER — HYDROMORPHONE HYDROCHLORIDE 1 MG/ML
0.2 INJECTION, SOLUTION INTRAMUSCULAR; INTRAVENOUS; SUBCUTANEOUS
Status: DISCONTINUED | OUTPATIENT
Start: 2019-05-23 | End: 2019-05-23

## 2019-05-23 RX ORDER — ACETAMINOPHEN 325 MG/1
650 TABLET ORAL ONCE
Status: DISCONTINUED | OUTPATIENT
Start: 2019-05-23 | End: 2019-05-23 | Stop reason: HOSPADM

## 2019-05-23 RX ORDER — HEPARIN SODIUM 5000 [USP'U]/ML
5000 INJECTION, SOLUTION INTRAVENOUS; SUBCUTANEOUS EVERY 8 HOURS
Status: DISCONTINUED | OUTPATIENT
Start: 2019-05-23 | End: 2019-05-27 | Stop reason: HOSPADM

## 2019-05-23 RX ORDER — SODIUM CHLORIDE 0.9 % (FLUSH) 0.9 %
5-40 SYRINGE (ML) INJECTION AS NEEDED
Status: DISCONTINUED | OUTPATIENT
Start: 2019-05-23 | End: 2019-05-23 | Stop reason: HOSPADM

## 2019-05-23 RX ORDER — LIDOCAINE HYDROCHLORIDE 20 MG/ML
INJECTION, SOLUTION EPIDURAL; INFILTRATION; INTRACAUDAL; PERINEURAL AS NEEDED
Status: DISCONTINUED | OUTPATIENT
Start: 2019-05-23 | End: 2019-05-23 | Stop reason: HOSPADM

## 2019-05-23 RX ORDER — DEXAMETHASONE SODIUM PHOSPHATE 4 MG/ML
INJECTION, SOLUTION INTRA-ARTICULAR; INTRALESIONAL; INTRAMUSCULAR; INTRAVENOUS; SOFT TISSUE AS NEEDED
Status: DISCONTINUED | OUTPATIENT
Start: 2019-05-23 | End: 2019-05-23 | Stop reason: HOSPADM

## 2019-05-23 RX ORDER — LIDOCAINE HYDROCHLORIDE 10 MG/ML
0.1 INJECTION, SOLUTION EPIDURAL; INFILTRATION; INTRACAUDAL; PERINEURAL AS NEEDED
Status: DISCONTINUED | OUTPATIENT
Start: 2019-05-23 | End: 2019-05-23 | Stop reason: HOSPADM

## 2019-05-23 RX ORDER — FENTANYL CITRATE 50 UG/ML
INJECTION, SOLUTION INTRAMUSCULAR; INTRAVENOUS AS NEEDED
Status: DISCONTINUED | OUTPATIENT
Start: 2019-05-23 | End: 2019-05-23 | Stop reason: HOSPADM

## 2019-05-23 RX ORDER — POTASSIUM CHLORIDE 750 MG/1
10 TABLET, FILM COATED, EXTENDED RELEASE ORAL
Status: COMPLETED | OUTPATIENT
Start: 2019-05-23 | End: 2019-05-24

## 2019-05-23 RX ORDER — ONDANSETRON 2 MG/ML
INJECTION INTRAMUSCULAR; INTRAVENOUS AS NEEDED
Status: DISCONTINUED | OUTPATIENT
Start: 2019-05-23 | End: 2019-05-23 | Stop reason: HOSPADM

## 2019-05-23 RX ORDER — SODIUM CHLORIDE 9 MG/ML
100 INJECTION, SOLUTION INTRAVENOUS CONTINUOUS
Status: DISCONTINUED | OUTPATIENT
Start: 2019-05-23 | End: 2019-05-26

## 2019-05-23 RX ORDER — MORPHINE SULFATE 2 MG/ML
4 INJECTION, SOLUTION INTRAMUSCULAR; INTRAVENOUS
Status: COMPLETED | OUTPATIENT
Start: 2019-05-23 | End: 2019-05-23

## 2019-05-23 RX ORDER — SODIUM CHLORIDE 9 MG/ML
25 INJECTION, SOLUTION INTRAVENOUS CONTINUOUS
Status: DISCONTINUED | OUTPATIENT
Start: 2019-05-23 | End: 2019-05-23 | Stop reason: HOSPADM

## 2019-05-23 RX ORDER — OXYCODONE AND ACETAMINOPHEN 5; 325 MG/1; MG/1
1 TABLET ORAL AS NEEDED
Status: DISCONTINUED | OUTPATIENT
Start: 2019-05-23 | End: 2019-05-23

## 2019-05-23 RX ORDER — ROCURONIUM BROMIDE 10 MG/ML
INJECTION, SOLUTION INTRAVENOUS AS NEEDED
Status: DISCONTINUED | OUTPATIENT
Start: 2019-05-23 | End: 2019-05-23 | Stop reason: HOSPADM

## 2019-05-23 RX ORDER — SODIUM CHLORIDE, SODIUM LACTATE, POTASSIUM CHLORIDE, CALCIUM CHLORIDE 600; 310; 30; 20 MG/100ML; MG/100ML; MG/100ML; MG/100ML
125 INJECTION, SOLUTION INTRAVENOUS CONTINUOUS
Status: DISCONTINUED | OUTPATIENT
Start: 2019-05-23 | End: 2019-05-23

## 2019-05-23 RX ORDER — MIDAZOLAM HYDROCHLORIDE 1 MG/ML
1 INJECTION, SOLUTION INTRAMUSCULAR; INTRAVENOUS AS NEEDED
Status: DISCONTINUED | OUTPATIENT
Start: 2019-05-23 | End: 2019-05-23 | Stop reason: HOSPADM

## 2019-05-23 RX ORDER — SODIUM CHLORIDE 0.9 % (FLUSH) 0.9 %
5-40 SYRINGE (ML) INJECTION EVERY 8 HOURS
Status: DISCONTINUED | OUTPATIENT
Start: 2019-05-23 | End: 2019-05-23 | Stop reason: HOSPADM

## 2019-05-23 RX ORDER — MORPHINE SULFATE 10 MG/ML
2 INJECTION, SOLUTION INTRAMUSCULAR; INTRAVENOUS
Status: DISCONTINUED | OUTPATIENT
Start: 2019-05-23 | End: 2019-05-23

## 2019-05-23 RX ORDER — KETOROLAC TROMETHAMINE 30 MG/ML
15 INJECTION, SOLUTION INTRAMUSCULAR; INTRAVENOUS
Status: COMPLETED | OUTPATIENT
Start: 2019-05-23 | End: 2019-05-23

## 2019-05-23 RX ORDER — FENTANYL CITRATE 50 UG/ML
50 INJECTION, SOLUTION INTRAMUSCULAR; INTRAVENOUS AS NEEDED
Status: DISCONTINUED | OUTPATIENT
Start: 2019-05-23 | End: 2019-05-23 | Stop reason: HOSPADM

## 2019-05-23 RX ORDER — ACETAMINOPHEN 325 MG/1
650 TABLET ORAL
Status: DISCONTINUED | OUTPATIENT
Start: 2019-05-23 | End: 2019-05-27 | Stop reason: HOSPADM

## 2019-05-23 RX ORDER — SODIUM CHLORIDE 0.9 % (FLUSH) 0.9 %
5-40 SYRINGE (ML) INJECTION AS NEEDED
Status: DISCONTINUED | OUTPATIENT
Start: 2019-05-23 | End: 2019-05-23

## 2019-05-23 RX ORDER — DIPHENHYDRAMINE HYDROCHLORIDE 50 MG/ML
12.5 INJECTION, SOLUTION INTRAMUSCULAR; INTRAVENOUS AS NEEDED
Status: DISCONTINUED | OUTPATIENT
Start: 2019-05-23 | End: 2019-05-23

## 2019-05-23 RX ORDER — SODIUM CHLORIDE 0.9 % (FLUSH) 0.9 %
5-40 SYRINGE (ML) INJECTION AS NEEDED
Status: DISCONTINUED | OUTPATIENT
Start: 2019-05-23 | End: 2019-05-27 | Stop reason: HOSPADM

## 2019-05-23 RX ORDER — SODIUM CHLORIDE 0.9 % (FLUSH) 0.9 %
5-40 SYRINGE (ML) INJECTION EVERY 8 HOURS
Status: DISCONTINUED | OUTPATIENT
Start: 2019-05-23 | End: 2019-05-27 | Stop reason: HOSPADM

## 2019-05-23 RX ORDER — OXYCODONE HYDROCHLORIDE 5 MG/1
5 TABLET ORAL
Status: DISCONTINUED | OUTPATIENT
Start: 2019-05-23 | End: 2019-05-27 | Stop reason: HOSPADM

## 2019-05-23 RX ORDER — TAMSULOSIN HYDROCHLORIDE 0.4 MG/1
0.4 CAPSULE ORAL DAILY
Status: DISCONTINUED | OUTPATIENT
Start: 2019-05-24 | End: 2019-05-27 | Stop reason: HOSPADM

## 2019-05-23 RX ORDER — SUCCINYLCHOLINE CHLORIDE 20 MG/ML
INJECTION INTRAMUSCULAR; INTRAVENOUS AS NEEDED
Status: DISCONTINUED | OUTPATIENT
Start: 2019-05-23 | End: 2019-05-23 | Stop reason: HOSPADM

## 2019-05-23 RX ORDER — ONDANSETRON 2 MG/ML
4 INJECTION INTRAMUSCULAR; INTRAVENOUS
Status: DISCONTINUED | OUTPATIENT
Start: 2019-05-23 | End: 2019-05-27 | Stop reason: HOSPADM

## 2019-05-23 RX ORDER — SODIUM CHLORIDE 0.9 % (FLUSH) 0.9 %
5-40 SYRINGE (ML) INJECTION EVERY 8 HOURS
Status: DISCONTINUED | OUTPATIENT
Start: 2019-05-23 | End: 2019-05-23

## 2019-05-23 RX ORDER — MIDAZOLAM HYDROCHLORIDE 1 MG/ML
0.5 INJECTION, SOLUTION INTRAMUSCULAR; INTRAVENOUS
Status: DISCONTINUED | OUTPATIENT
Start: 2019-05-23 | End: 2019-05-23

## 2019-05-23 RX ORDER — ONDANSETRON 2 MG/ML
4 INJECTION INTRAMUSCULAR; INTRAVENOUS
Status: COMPLETED | OUTPATIENT
Start: 2019-05-23 | End: 2019-05-23

## 2019-05-23 RX ADMIN — SUCCINYLCHOLINE CHLORIDE 100 MG: 20 INJECTION INTRAMUSCULAR; INTRAVENOUS at 19:45

## 2019-05-23 RX ADMIN — ONDANSETRON 4 MG: 2 INJECTION INTRAMUSCULAR; INTRAVENOUS at 19:50

## 2019-05-23 RX ADMIN — SODIUM CHLORIDE 1000 ML: 900 INJECTION, SOLUTION INTRAVENOUS at 16:33

## 2019-05-23 RX ADMIN — ROCURONIUM BROMIDE 10 MG: 10 INJECTION, SOLUTION INTRAVENOUS at 19:44

## 2019-05-23 RX ADMIN — DEXAMETHASONE SODIUM PHOSPHATE 4 MG: 4 INJECTION, SOLUTION INTRA-ARTICULAR; INTRALESIONAL; INTRAMUSCULAR; INTRAVENOUS; SOFT TISSUE at 19:50

## 2019-05-23 RX ADMIN — KETOROLAC TROMETHAMINE 15 MG: 30 INJECTION, SOLUTION INTRAMUSCULAR at 16:34

## 2019-05-23 RX ADMIN — CEFTRIAXONE SODIUM 2 G: 2 INJECTION, POWDER, FOR SOLUTION INTRAMUSCULAR; INTRAVENOUS at 16:38

## 2019-05-23 RX ADMIN — FENTANYL CITRATE 25 MCG: 50 INJECTION, SOLUTION INTRAMUSCULAR; INTRAVENOUS at 19:44

## 2019-05-23 RX ADMIN — FENTANYL CITRATE 25 MCG: 50 INJECTION, SOLUTION INTRAMUSCULAR; INTRAVENOUS at 19:54

## 2019-05-23 RX ADMIN — MORPHINE SULFATE 4 MG: 2 INJECTION, SOLUTION INTRAMUSCULAR; INTRAVENOUS at 16:36

## 2019-05-23 RX ADMIN — SODIUM CHLORIDE 100 ML/HR: 900 INJECTION, SOLUTION INTRAVENOUS at 21:00

## 2019-05-23 RX ADMIN — FENTANYL CITRATE 25 MCG: 50 INJECTION, SOLUTION INTRAMUSCULAR; INTRAVENOUS at 20:00

## 2019-05-23 RX ADMIN — SODIUM CHLORIDE, POTASSIUM CHLORIDE, SODIUM LACTATE AND CALCIUM CHLORIDE: 600; 310; 30; 20 INJECTION, SOLUTION INTRAVENOUS at 19:30

## 2019-05-23 RX ADMIN — LIDOCAINE HYDROCHLORIDE 60 MG: 20 INJECTION, SOLUTION EPIDURAL; INFILTRATION; INTRACAUDAL; PERINEURAL at 19:44

## 2019-05-23 RX ADMIN — FENTANYL CITRATE 25 MCG: 50 INJECTION, SOLUTION INTRAMUSCULAR; INTRAVENOUS at 20:02

## 2019-05-23 RX ADMIN — ONDANSETRON 4 MG: 2 INJECTION INTRAMUSCULAR; INTRAVENOUS at 16:33

## 2019-05-23 RX ADMIN — PROPOFOL 150 MG: 10 INJECTION, EMULSION INTRAVENOUS at 19:44

## 2019-05-23 RX ADMIN — MIDAZOLAM HYDROCHLORIDE 2 MG: 1 INJECTION, SOLUTION INTRAMUSCULAR; INTRAVENOUS at 19:40

## 2019-05-23 NOTE — PROGRESS NOTES
Discussed placing l ureteral stent w pt and spouse and risks including anesth, bleeding, infection and risk of ureteral injury and pos inability to get stent in. She agrees and wants to proceed. Guillaume Chung

## 2019-05-23 NOTE — ED NOTES
Seen, examined  Reviewed CT from yesterday  GNR in urine cx  NPO  CTX IV  ivf  Admit  Consulted Urology  ED EKG interpretation:  Rhythm: sinus tachycardia; and regular . Rate (approx.): 120; Axis: normal; P wave: normal; QRS interval: normal ; ST/T wave: normal;  This EKG was interpreted by Cristie Litten, DO,ED Provider. Hospitalist Irlanda for Admission  4:53 PM    ED Room Number: R32/R32  Patient Name and age:  Vanita Wray 63 y.o.  female  Working Diagnosis:   1. Acute febrile illness    2. Pyelonephritis    3.  Kidney stone      Readmission: no  Isolation Requirements:  no  Recommended Level of Care:  med/surg  Code Status:  Full Urology on board

## 2019-05-23 NOTE — ANESTHESIA PREPROCEDURE EVALUATION
Relevant Problems   No relevant active problems       Anesthetic History   No history of anesthetic complications            Review of Systems / Medical History  Patient summary reviewed, nursing notes reviewed and pertinent labs reviewed    Pulmonary  Within defined limits                 Neuro/Psych   Within defined limits           Cardiovascular  Within defined limits  Hypertension              Exercise tolerance: >4 METS     GI/Hepatic/Renal         Renal disease: stones       Endo/Other        Arthritis     Other Findings              Physical Exam    Airway  Mallampati: II  TM Distance: > 6 cm  Neck ROM: normal range of motion   Mouth opening: Normal     Cardiovascular  Regular rate and rhythm,  S1 and S2 normal,  no murmur, click, rub, or gallop             Dental  No notable dental hx       Pulmonary  Breath sounds clear to auscultation               Abdominal  GI exam deferred       Other Findings            Anesthetic Plan    ASA: 2  Anesthesia type: general          Induction: Intravenous  Anesthetic plan and risks discussed with: Patient and Spouse

## 2019-05-23 NOTE — PROGRESS NOTES
TRANSFER - IN REPORT:    Verbal report received from Sharyle Cal RN(name) on Jarred Reza  being received from ED(unit) for routine progression of care      Report consisted of patients Situation, Background, Assessment and   Recommendations(SBAR). Information from the following report(s) SBAR, Intake/Output, MAR, Recent Results and Cardiac Rhythm SR was reviewed with the receiving nurse. Opportunity for questions and clarification was provided. Assessment completed upon patients arrival to unit and care assumed.

## 2019-05-23 NOTE — ED PROVIDER NOTES
62 y/o female with  PMHx of HTN, HLD and arthritis, presenting with complaint of fever and left flank pain. The patient states that she was seen here in the ED yesterday morning and was diagnosed with a kidney stone and UTI. She was discharged with keflex, flomax, naproxen, hydrocodone and zofran, and was instructed to return to the ED if she started running a fever. This afternoon she had a temp of 102.7, which prompted her return to the ED today. She reports left flank pain that is slightly improved from yesterday but still severe, somewhat relieved by hydrocodone. She endorses nausea and light-headedness, but denies vomiting, dysuria or syncope. The history is provided by the patient. Past Medical History:   Diagnosis Date    Arthritis     High cholesterol     Hypertension        Past Surgical History:   Procedure Laterality Date    HX ORTHOPAEDIC      plantar tendon release         History reviewed. No pertinent family history. Social History     Socioeconomic History    Marital status: SINGLE     Spouse name: Not on file    Number of children: Not on file    Years of education: Not on file    Highest education level: Not on file   Occupational History    Not on file   Social Needs    Financial resource strain: Not on file    Food insecurity:     Worry: Not on file     Inability: Not on file    Transportation needs:     Medical: Not on file     Non-medical: Not on file   Tobacco Use    Smoking status: Never Smoker    Smokeless tobacco: Never Used   Substance and Sexual Activity    Alcohol use:  Yes     Alcohol/week: 8.4 oz     Types: 14 Glasses of wine per week     Frequency: Never    Drug use: Never    Sexual activity: Not on file   Lifestyle    Physical activity:     Days per week: Not on file     Minutes per session: Not on file    Stress: Not on file   Relationships    Social connections:     Talks on phone: Not on file     Gets together: Not on file     Attends Orthodoxy service: Not on file     Active member of club or organization: Not on file     Attends meetings of clubs or organizations: Not on file     Relationship status: Not on file    Intimate partner violence:     Fear of current or ex partner: Not on file     Emotionally abused: Not on file     Physically abused: Not on file     Forced sexual activity: Not on file   Other Topics Concern    Not on file   Social History Narrative    Not on file         ALLERGIES: Codeine; Percocet [oxycodone-acetaminophen]; and Percodan [oxycodone hcl-oxycodone-asa]    Review of Systems   Constitutional: Positive for fever (102.7). Negative for chills. HENT: Negative for congestion. Respiratory: Negative for cough. Gastrointestinal: Positive for abdominal pain (LLQ) and nausea. Negative for diarrhea and vomiting. Genitourinary: Negative for dysuria, frequency, hematuria and urgency. Musculoskeletal: Positive for back pain (left-sided). Neurological: Positive for light-headedness. Negative for syncope. Vitals:    05/23/19 1521   BP: 115/73   Pulse: (!) 136   Resp: 16   Temp: 99 °F (37.2 °C)   SpO2: 94%            Physical Exam   Constitutional: She is oriented to person, place, and time. She appears well-developed and well-nourished. No distress. HENT:   Head: Normocephalic and atraumatic. Eyes: Conjunctivae and EOM are normal.   Neck: Normal range of motion. Neck supple. Cardiovascular: Regular rhythm and normal heart sounds. Tachycardia present. Pulmonary/Chest: Effort normal and breath sounds normal.   Abdominal: Soft. She exhibits no distension. There is tenderness (mild LLQ). There is no rebound and no guarding. Musculoskeletal: Normal range of motion. Neurological: She is alert and oriented to person, place, and time. Skin: Skin is warm and dry. She is not diaphoretic. Nursing note and vitals reviewed.        MDM  Number of Diagnoses or Management Options  Acute febrile illness:   Kidney stone: Pyelonephritis:      Amount and/or Complexity of Data Reviewed  Clinical lab tests: ordered and reviewed  Discuss the patient with other providers: yes (Dr. Gala Granado, ED attending)    Patient Progress  Patient progress: stable         Procedures        64 y/o female with  PMHx of HTN, HLD and arthritis, presenting with complaint of fever and left flank pain. Temp 99 on arrival with . Will consult urology for infected kidney stone. Patient given fluid bolus, rocephin and morphine. Labs significant for WBC 13.5, Cr 1.48 with GFR 36. Consult Note - 4:33 PM  I have spoken with Dr. Marquita Pineda. Discussed patient's presentation, history, physical assessment, and available diagnostic results. Plan is for admission and stent. All available results have been reviewed with the patient and any available family. Care plan has been outlined and questions have been answered. Patient and any available family understands and agrees to need for admission to hospital for further treatment not available in ED. Patient is ready for admission.

## 2019-05-23 NOTE — PROGRESS NOTES
TRANSFER - IN REPORT:    Verbal report received from JACKRN(name) on [address]  being received from ED(unit) for ordered procedure      Report consisted of patients Situation, Background, Assessment and   Recommendations(SBAR). Information from the following report(s) MAR, Recent Results and Pre Procedure Checklist was reviewed with the receiving nurse. Opportunity for questions and clarification was provided. Assessment completed upon patients arrival to unit and care assumed.

## 2019-05-23 NOTE — LETTER
[address] 
[place] [address] [id number] 
[phone] Work/School Note Date: 5/23/2019 To Whom It May concern: 
 
Lilia Wiley was seen and treated today in the hospital from 5/23/2019 to 5/26/2019. Lilia Wiley may return to work on 06/23/2019. Sincerely, Kingsley Harman MD

## 2019-05-23 NOTE — CONSULTS
Urology Consult    Patient: Lilia Wiley MRN: [id number]  SSN: [id number]    YOB: 1960  Age: 63 y.o. Sex: female          Date of Consultation:  May 23, 2019  Requesting Physician: Maria Dolores Eagle DO  Reason for Consultation: left flank pain and high grade fever         History of Present Illness:  Lilia Wiley is a 63 y.o. female admitted 5/23/2019 to the hospital for UTI (urinary tract infection) [N39.0]. She has history of HTN, HLD and arthritis, presenting with complaint of fever and left flank pain. She was at [facility] ER yesterday with pain. CT had shown 6 mm left mid-ureter stone with hydro. White counts were elevated but no fever. After reaching home, she developed fever 102.7 with pain. Urine culture from yesterday is growing GNR. Currently, she feels comfortable, alert and awake. Does report discomfort left side after receiving iv pain meds. Last ate light lunch at noon. Past Medical History:   Diagnosis Date    Arthritis     High cholesterol     Hypertension         Past Surgical History:   Procedure Laterality Date    HX ORTHOPAEDIC      plantar tendon release       Allergies   Allergen Reactions    Codeine Nausea Only    Percocet [Oxycodone-Acetaminophen] Nausea Only    Percodan [Oxycodone Hcl-Oxycodone-Asa] Nausea Only        Prior to Admission medications    Medication Sig Start Date End Date Taking? Authorizing Provider   HYDROcodone-acetaminophen (VICODIN) 5-300 mg tablet Take 1 Tab by mouth every four (4) hours as needed for Pain for up to 5 days. Max Daily Amount: 6 Tabs. 5/22/19 5/27/19  Nia Rush MD   naproxen (NAPROSYN) 500 mg tablet Take 1 Tab by mouth two (2) times daily (with meals). 5/22/19   Nia Rush MD   ondansetron (ZOFRAN ODT) 4 mg disintegrating tablet Take 1 Tab by mouth every eight (8) hours as needed for Nausea. 5/22/19   Nia Rush MD   tamsulosin (FLOMAX) 0.4 mg capsule Take 1 Cap by mouth daily for 7 days. 19  Derrick Alberts MD   cephALEXin (KEFLEX) 500 mg capsule Take 1 Cap by mouth four (4) times daily for 7 days. 19  Derrick Alberts MD       Social History     Tobacco Use    Smoking status: Never Smoker    Smokeless tobacco: Never Used   Substance Use Topics    Alcohol use: Yes     Alcohol/week: 8.4 oz     Types: 14 Glasses of wine per week     Frequency: Never        History reviewed. No pertinent family history. ROS:  Admission ROS from 2019 was reviewed and changes (other than per HPI) include: none. Physical Exam:    Vital Signs:  Temp (24hrs), Av °F (37.2 °C), Min:99 °F (37.2 °C), Max:99 °F (37.2 °C)   Blood pressure 115/73, pulse (!) 136, temperature 99 °F (37.2 °C), resp. rate 16, last menstrual period 2018, SpO2 94 %. I&O's:  No intake/output data recorded. Appearance: well-developed NAD   Conjunctiva/Lids: conjunctivae and lids normal   External Ears/Nose: normal no lesions or deformities   Neck: supple   Respiratory Effort: breathing easily   Lower Extremity: no edema   Abdomen/Flank: soft non-tender without masses; left CVA tenderness   Hernia: no ventral hernia   Gait/Station: normal   Skin Inspection: warm and dry   Mood/Affect: normal      Lab Review:     CBC   Recent Labs     19  1603 19  0533   WBC 13.5* 20.7*   HGB 11.5 11.8   HCT 35.9 36.5    265     CMP   Recent Labs     19  1603 19  0533    139   K 3.4* 3.6    106   CO2 24 24   * 143*   BUN 29* 18   CREA 1.48* 1.11*   CA 8.9 9.2   ALB  --  3.5   TBILI  --  1.7*   SGOT  --  30   ALT  --  29       Other No results for input(s): INR, PSA in the last 72 hours.     No lab exists for component: PTT, INREXT    UA:   Lab Results   Component Value Date/Time    Color DARK YELLOW 2019 05:23 AM    Appearance CLOUDY (A) 2019 05:23 AM    Specific gravity 1.026 2019 05:23 AM    pH (UA) 6.0 2019 05:23 AM    Protein 30 (A) 2019 05:23 AM    Glucose NEGATIVE  05/22/2019 05:23 AM    Ketone NEGATIVE  05/22/2019 05:23 AM    Bilirubin NEGATIVE  05/22/2019 05:23 AM    Urobilinogen 0.2 05/22/2019 05:23 AM    Nitrites NEGATIVE  05/22/2019 05:23 AM    Leukocyte Esterase LARGE (A) 05/22/2019 05:23 AM    Epithelial cells FEW 05/22/2019 05:23 AM    Bacteria NEGATIVE  05/22/2019 05:23 AM    WBC >100 (H) 05/22/2019 05:23 AM    RBC 5-10 05/22/2019 05:23 AM       Cultures:      Imaging:      Assessment:     Active Problems:    UTI (urinary tract infection) (5/23/2019)      Obstructing left ureteral stone, high fever, tachycardia, elevated white counts. Plan:     1. Urgent stent placement on the left side tonight. Case added to OR schedule and Dr. Destiny Johnston notified. 2. She understands stent is temp and that she will need definitive surgery later. Understands risks including bleeding, worsening sepsis, need for additional procedure, failure of procedure. 3. Keep NPO. Aggressive hydration and pain control.     Signed By: Paco Mandel MD  - May 23, 2019

## 2019-05-24 LAB
ANION GAP SERPL CALC-SCNC: 5 MMOL/L (ref 5–15)
BACTERIA SPEC CULT: ABNORMAL
BASOPHILS # BLD: 0 K/UL (ref 0–0.1)
BASOPHILS NFR BLD: 0 % (ref 0–1)
BUN SERPL-MCNC: 28 MG/DL (ref 6–20)
BUN/CREAT SERPL: 23 (ref 12–20)
CALCIUM SERPL-MCNC: 8.8 MG/DL (ref 8.5–10.1)
CC UR VC: ABNORMAL
CHLORIDE SERPL-SCNC: 112 MMOL/L (ref 97–108)
CO2 SERPL-SCNC: 22 MMOL/L (ref 21–32)
CREAT SERPL-MCNC: 1.23 MG/DL (ref 0.55–1.02)
DIFFERENTIAL METHOD BLD: ABNORMAL
EOSINOPHIL # BLD: 0 K/UL (ref 0–0.4)
EOSINOPHIL NFR BLD: 0 % (ref 0–7)
ERYTHROCYTE [DISTWIDTH] IN BLOOD BY AUTOMATED COUNT: 12.3 % (ref 11.5–14.5)
GLUCOSE SERPL-MCNC: 162 MG/DL (ref 65–100)
HCT VFR BLD AUTO: 33.4 % (ref 35–47)
HGB BLD-MCNC: 10.5 G/DL (ref 11.5–16)
IMM GRANULOCYTES # BLD AUTO: 0 K/UL
IMM GRANULOCYTES NFR BLD AUTO: 0 %
LYMPHOCYTES # BLD: 0.7 K/UL (ref 0.8–3.5)
LYMPHOCYTES NFR BLD: 5 % (ref 12–49)
MCH RBC QN AUTO: 30 PG (ref 26–34)
MCHC RBC AUTO-ENTMCNC: 31.4 G/DL (ref 30–36.5)
MCV RBC AUTO: 95.4 FL (ref 80–99)
MONOCYTES # BLD: 0.7 K/UL (ref 0–1)
MONOCYTES NFR BLD: 5 % (ref 5–13)
MYELOCYTES NFR BLD MANUAL: 1 %
NEUTS BAND NFR BLD MANUAL: 9 % (ref 0–6)
NEUTS SEG # BLD: 11.6 K/UL (ref 1.8–8)
NEUTS SEG NFR BLD: 80 % (ref 32–75)
NRBC # BLD: 0 K/UL (ref 0–0.01)
NRBC BLD-RTO: 0 PER 100 WBC
PLATELET # BLD AUTO: 187 K/UL (ref 150–400)
PLATELET COMMENTS,PCOM: ABNORMAL
PMV BLD AUTO: 10.3 FL (ref 8.9–12.9)
POTASSIUM SERPL-SCNC: 4.4 MMOL/L (ref 3.5–5.1)
RBC # BLD AUTO: 3.5 M/UL (ref 3.8–5.2)
RBC MORPH BLD: ABNORMAL
SERVICE CMNT-IMP: ABNORMAL
SODIUM SERPL-SCNC: 139 MMOL/L (ref 136–145)
WBC # BLD AUTO: 13 K/UL (ref 3.6–11)

## 2019-05-24 PROCEDURE — 74011250636 HC RX REV CODE- 250/636: Performed by: UROLOGY

## 2019-05-24 PROCEDURE — 74011250637 HC RX REV CODE- 250/637: Performed by: UROLOGY

## 2019-05-24 PROCEDURE — 74011250637 HC RX REV CODE- 250/637: Performed by: FAMILY MEDICINE

## 2019-05-24 PROCEDURE — 74011000250 HC RX REV CODE- 250: Performed by: UROLOGY

## 2019-05-24 PROCEDURE — 74011250636 HC RX REV CODE- 250/636: Performed by: FAMILY MEDICINE

## 2019-05-24 PROCEDURE — 80048 BASIC METABOLIC PNL TOTAL CA: CPT

## 2019-05-24 PROCEDURE — 77030027138 HC INCENT SPIROMETER -A

## 2019-05-24 PROCEDURE — 65270000029 HC RM PRIVATE

## 2019-05-24 PROCEDURE — 85025 COMPLETE CBC W/AUTO DIFF WBC: CPT

## 2019-05-24 PROCEDURE — 74011000258 HC RX REV CODE- 258: Performed by: FAMILY MEDICINE

## 2019-05-24 PROCEDURE — 97116 GAIT TRAINING THERAPY: CPT | Performed by: PHYSICAL THERAPIST

## 2019-05-24 PROCEDURE — 36415 COLL VENOUS BLD VENIPUNCTURE: CPT

## 2019-05-24 PROCEDURE — 97161 PT EVAL LOW COMPLEX 20 MIN: CPT | Performed by: PHYSICAL THERAPIST

## 2019-05-24 RX ORDER — LOSARTAN POTASSIUM 50 MG/1
100 TABLET ORAL DAILY
Status: DISCONTINUED | OUTPATIENT
Start: 2019-05-24 | End: 2019-05-27 | Stop reason: HOSPADM

## 2019-05-24 RX ORDER — ROSUVASTATIN CALCIUM 10 MG/1
10 TABLET, COATED ORAL DAILY
COMMUNITY

## 2019-05-24 RX ORDER — ROSUVASTATIN CALCIUM 10 MG/1
10 TABLET, COATED ORAL DAILY
Status: DISCONTINUED | OUTPATIENT
Start: 2019-05-24 | End: 2019-05-27 | Stop reason: HOSPADM

## 2019-05-24 RX ORDER — LOSARTAN POTASSIUM 100 MG/1
100 TABLET ORAL DAILY
COMMUNITY

## 2019-05-24 RX ADMIN — THIAMINE HYDROCHLORIDE: 100 INJECTION, SOLUTION INTRAMUSCULAR; INTRAVENOUS at 11:57

## 2019-05-24 RX ADMIN — HEPARIN SODIUM 5000 UNITS: 5000 INJECTION INTRAVENOUS; SUBCUTANEOUS at 00:36

## 2019-05-24 RX ADMIN — Medication 10 ML: at 15:27

## 2019-05-24 RX ADMIN — CEFTRIAXONE 1 G: 1 INJECTION, POWDER, FOR SOLUTION INTRAMUSCULAR; INTRAVENOUS at 18:17

## 2019-05-24 RX ADMIN — HEPARIN SODIUM 5000 UNITS: 5000 INJECTION INTRAVENOUS; SUBCUTANEOUS at 08:53

## 2019-05-24 RX ADMIN — ACETAMINOPHEN 650 MG: 325 TABLET ORAL at 18:26

## 2019-05-24 RX ADMIN — POTASSIUM CHLORIDE 10 MEQ: 750 TABLET, EXTENDED RELEASE ORAL at 00:35

## 2019-05-24 RX ADMIN — ROSUVASTATIN CALCIUM 10 MG: 10 TABLET, FILM COATED ORAL at 08:52

## 2019-05-24 RX ADMIN — HEPARIN SODIUM 5000 UNITS: 5000 INJECTION INTRAVENOUS; SUBCUTANEOUS at 18:18

## 2019-05-24 RX ADMIN — Medication 10 ML: at 06:38

## 2019-05-24 RX ADMIN — LOSARTAN POTASSIUM 100 MG: 50 TABLET ORAL at 08:52

## 2019-05-24 RX ADMIN — Medication 10 ML: at 00:36

## 2019-05-24 RX ADMIN — TAMSULOSIN HYDROCHLORIDE 0.4 MG: 0.4 CAPSULE ORAL at 08:53

## 2019-05-24 NOTE — ANESTHESIA POSTPROCEDURE EVALUATION
Post-Anesthesia Evaluation and Assessment    Patient: Jules Mcdermott MRN: [id number]  SSN: [id number]    YOB: 1960  Age: 63 y.o. Sex: female      I have evaluated the patient and they are stable and ready for discharge from the PACU. Cardiovascular Function/Vital Signs  Visit Vitals  /57   Pulse (!) 102   Temp 36.9 °C (98.4 °F)   Resp 22   SpO2 100%       Patient is status post General anesthesia for Procedure(s):  CYSTOSCOPY URETERAL LEFT STENT INSERTION. Nausea/Vomiting: None    Postoperative hydration reviewed and adequate. Pain:  Pain Scale 1: Numeric (0 - 10) (05/23/19 2023)  Pain Intensity 1: 0 (05/23/19 2023)   Managed    Neurological Status:   Neuro (WDL): Within Defined Limits (05/23/19 2023)  Neuro  LUE Motor Response: Spontaneous  (05/23/19 2023)  LLE Motor Response: Spontaneous  (05/23/19 2023)  RUE Motor Response: Spontaneous  (05/23/19 2023)  RLE Motor Response: Spontaneous  (05/23/19 2023)   At baseline    Mental Status, Level of Consciousness: Alert and  oriented to person, place, and time    Pulmonary Status:   O2 Device: Room air (05/23/19 2045)   Adequate oxygenation and airway patent    Complications related to anesthesia: None    Post-anesthesia assessment completed. No concerns    Signed By: Sheng Bryant MD     May 23, 2019              Procedure(s):  CYSTOSCOPY URETERAL LEFT STENT INSERTION. general    <BSHSIANPOST>    Vitals Value Taken Time   /57 5/23/2019  8:45 PM   Temp 36.9 °C (98.4 °F) 5/23/2019  8:23 PM   Pulse 99 5/23/2019  8:52 PM   Resp 23 5/23/2019  8:52 PM   SpO2 99 % 5/23/2019  8:52 PM   Vitals shown include unvalidated device data.

## 2019-05-24 NOTE — ROUTINE PROCESS
Patient: Madan Marin MRN: [id number]  SSN: [id number] YOB: 1960  Age: 63 y.o. Sex: female Patient is status post Procedure(s): 
CYSTOSCOPY URETERAL LEFT STENT INSERTION. Surgeon(s) and Role: Nakita Davis MD - Primary Local/Dose/Irrigation: see mar Peripheral IV 05/23/19 Right Antecubital (Active) Site Assessment Clean, dry, & intact 5/23/2019  4:05 PM  
Phlebitis Assessment 0 5/23/2019  4:05 PM  
Infiltration Assessment 0 5/23/2019  4:05 PM  
Dressing Status Clean, dry, & intact 5/23/2019  4:05 PM  
Dressing Type Tape;Transparent 5/23/2019  4:05 PM  
Hub Color/Line Status Pink;Flushed;Patent 5/23/2019  4:05 PM  
Action Taken Blood drawn 5/23/2019  4:05 PM  
Alcohol Cap Used No 5/23/2019  4:05 PM  
   
Peripheral IV 05/23/19 Left Antecubital (Active) Site Assessment Clean, dry, & intact 5/23/2019  4:06 PM  
Phlebitis Assessment 0 5/23/2019  4:06 PM  
Infiltration Assessment 0 5/23/2019  4:06 PM  
Dressing Status Clean, dry, & intact 5/23/2019  4:06 PM  
Dressing Type Tape;Transparent 5/23/2019  4:06 PM  
Hub Color/Line Status Pink;Flushed;Patent 5/23/2019  4:06 PM  
Action Taken Blood drawn 5/23/2019  4:06 PM  
Alcohol Cap Used No 5/23/2019  4:06 PM  
   
Peripheral IV 05/23/19 Right Hand (Active) Dressing/Packing:      
Splint/Cast:  ] Other: 6 fr x 22cm left ureteral stent placed

## 2019-05-24 NOTE — OP NOTES
[address]   OPERATIVE REPORT    Name:  Nick Up  MR#:  [id number]  :  1960  ACCOUNT #:  [de-identified]  DATE OF SERVICE:  2019    PREOPERATIVE DIAGNOSES:  Urinary tract infection, urinary tract sepsis, hydronephrosis, pyonephrosis, left ureteral stone. POSTOPERATIVE DIAGNOSIS:  same. PROCEDURE PERFORMED:  Left retrograde pyelogram, cystoscopy, placement of left double-J stent. SURGEON:  Karine Cabello MD    ASSISTANT:  None. ANESTHESIA:  General.    COMPLICATIONS:  None. SPECIMENS REMOVED:  None. IMPLANTS:  None. ESTIMATED BLOOD LOSS:  None. PROCEDURE:  After anesthesia, the patient was prepped and draped in a sterile manner. A 21 cystoscope was passed into the urethra. The bladder was a little bit reddened and there was purulent-appearing urine, but no mucosal lesions. The ureteral orifices were normally positioned. The left ureteral orifice was cannulated with an open-ended catheter and contrast was gently injected which filled the collecting system. There appeared to be hydronephrotic system with obstruction involving the proximal ureter. A wire was inserted, curled in the renal pelvis, and then a 22 cm 6-Indonesian double-J stent was positioned between the renal pelvis and the bladder. The bladder was then drained with 16-Indonesian Loya catheter. Purulent material was noted to be exiting from the ureter consistent with pyonephrosis. The patient was reacted from anesthesia and transferred to the recovery room in stable condition.         Janette Boxer, MD      [id number]  D:  2019 20:29  T:  2019 3:19  JOB #:  [id number]  CC:  [address] [place] [address]

## 2019-05-24 NOTE — PROGRESS NOTES
Hospitalist Progress Note      Hospital summary: The patient is a 23-year-old female with past medical history of hypertension, hyperlipidemia, and arthritis, who presents to the hospital with the left flank pain. The patient reports this morning she woke up, had a fever of 102.7 degrees Fahrenheit, continued to have left flank pain, and tried taking her hydrocodone but that did not seem to relieve pain. She also had some increased nausea, lethargy, weakness, got concerned and decided to come to the hospital.  In the hospital, the patient was found to have worsening FIOR associated with tachycardia and possible early SIRS/sepsis, and the patient was requested to be admitted under the hospitalist service. . 5/23/2019      Assessment/Plan:   Nephrolithiasis with hydronephrosis s/p stent placement on 5/23  - urology on board  -  IV hydration  - urine cx: E coli  - Blood cx: NGTD  - c/w IV ceftriaxone  - on flomax  - carey removed today    FIOR - improving  - 2/2 above  - c/w IVF  - will monitor    Hypertension-  c/w losartan    Alcohol abuse   - she drinks 1-2 glasses of wine/ cocktails daily  - CIWA protocol, banana bag, Ativan p.r.n.    - counseled to quit    Code status: Full  DVT prophylaxis: Heparin  Disposition: TBD. Possible dc to home tomorrow  ----------------------------------------------    CC: Left flank pain. S: Patient is seen and examined at bedside. She underwent stent placement yesterday. Pain improved. Feels tired. Review of Systems:  A comprehensive review of systems was negative.     O:  Visit Vitals  /87 (BP 1 Location: Right arm, BP Patient Position: At rest)   Pulse 62   Temp 97.7 °F (36.5 °C)   Resp 16   LMP 01/05/2018   SpO2 99%       PHYSICAL EXAM:  Gen: NAD, non-toxic  HEENT: anicteric sclerae, normal conjunctiva, oropharynx clear, MM moist  Neck: supple, trachea midline, no adenopathy  Heart: RRR, no MRG, no JVD, no peripheral edema  Lungs: CTA b/l, non-labored respirations  Abd: soft, mild tenderness left flank, BS+, no organomegaly  Extr: warm  Skin: dry, no rash  Neuro: CN II-XII grossly intact, normal speech, moves all extremities  Psych: normal mood, appropriate affect      Intake/Output Summary (Last 24 hours) at 5/24/2019 1301  Last data filed at 5/24/2019 1201  Gross per 24 hour   Intake 500 ml   Output 1180 ml   Net -680 ml        Recent labs & imaging reviewed:  Recent Results (from the past 24 hour(s))   EKG, 12 LEAD, INITIAL    Collection Time: 05/23/19  3:56 PM   Result Value Ref Range    Ventricular Rate 120 BPM    Atrial Rate 120 BPM    P-R Interval 130 ms    QRS Duration 76 ms    Q-T Interval 292 ms    QTC Calculation (Bezet) 412 ms    Calculated P Axis 36 degrees    Calculated R Axis 46 degrees    Calculated T Axis -13 degrees    Diagnosis       Sinus tachycardia  When compared with ECG of 30-OCT-2018 05:05,  Vent. rate has increased BY  60 BPM  Criteria for Septal infarct are no longer present  T wave inversion more evident in Inferior leads  Confirmed by Misti Ca MD, Cali Wallace ([id number]) on 5/23/2019 4:17:53 PM     CBC WITH AUTOMATED DIFF    Collection Time: 05/23/19  4:03 PM   Result Value Ref Range    WBC 13.5 (H) 3.6 - 11.0 K/uL    RBC 3.80 3.80 - 5.20 M/uL    HGB 11.5 11.5 - 16.0 g/dL    HCT 35.9 35.0 - 47.0 %    MCV 94.5 80.0 - 99.0 FL    MCH 30.3 26.0 - 34.0 PG    MCHC 32.0 30.0 - 36.5 g/dL    RDW 11.9 11.5 - 14.5 %    PLATELET 844 111 - 929 K/uL    MPV 10.0 8.9 - 12.9 FL    NRBC 0.0 0  WBC    ABSOLUTE NRBC 0.00 0.00 - 0.01 K/uL    NEUTROPHILS 77 (H) 32 - 75 %    BAND NEUTROPHILS 15 (H) 0 - 6 %    LYMPHOCYTES 4 (L) 12 - 49 %    MONOCYTES 2 (L) 5 - 13 %    EOSINOPHILS 2 0 - 7 %    BASOPHILS 0 0 - 1 %    IMMATURE GRANULOCYTES 0 %    ABS. NEUTROPHILS 12.4 (H) 1.8 - 8.0 K/UL    ABS. LYMPHOCYTES 0.5 (L) 0.8 - 3.5 K/UL    ABS. MONOCYTES 0.3 0.0 - 1.0 K/UL    ABS. EOSINOPHILS 0.3 0.0 - 0.4 K/UL    ABS. BASOPHILS 0.0 0.0 - 0.1 K/UL    ABS. IMM. Camillo Schirmer. 0.0 K/UL    DF MANUAL      RBC COMMENTS NORMOCYTIC, NORMOCHROMIC      WBC COMMENTS ATYPICAL LYMPHOCYTES PRESENT     METABOLIC PANEL, BASIC    Collection Time: 05/23/19  4:03 PM   Result Value Ref Range    Sodium 136 136 - 145 mmol/L    Potassium 3.4 (L) 3.5 - 5.1 mmol/L    Chloride 104 97 - 108 mmol/L    CO2 24 21 - 32 mmol/L    Anion gap 8 5 - 15 mmol/L    Glucose 113 (H) 65 - 100 mg/dL    BUN 29 (H) 6 - 20 MG/DL    Creatinine 1.48 (H) 0.55 - 1.02 MG/DL    BUN/Creatinine ratio 20 12 - 20      GFR est AA 44 (L) >60 ml/min/1.73m2    GFR est non-AA 36 (L) >60 ml/min/1.73m2    Calcium 8.9 8.5 - 10.1 MG/DL   SAMPLES BEING HELD    Collection Time: 05/23/19  4:03 PM   Result Value Ref Range    SAMPLES BEING HELD 1RED,1BLU     COMMENT        Add-on orders for these samples will be processed based on acceptable specimen integrity and analyte stability, which may vary by analyte.    CULTURE, BLOOD, PAIRED    Collection Time: 05/23/19  4:03 PM   Result Value Ref Range    Special Requests: NO SPECIAL REQUESTS      Culture result: NO GROWTH AFTER 12 HOURS     POC LACTIC ACID    Collection Time: 05/23/19  4:03 PM   Result Value Ref Range    Lactic Acid (POC) 1.08 0.40 - 8.62 mmol/L   METABOLIC PANEL, BASIC    Collection Time: 05/24/19  4:47 AM   Result Value Ref Range    Sodium 139 136 - 145 mmol/L    Potassium 4.4 3.5 - 5.1 mmol/L    Chloride 112 (H) 97 - 108 mmol/L    CO2 22 21 - 32 mmol/L    Anion gap 5 5 - 15 mmol/L    Glucose 162 (H) 65 - 100 mg/dL    BUN 28 (H) 6 - 20 MG/DL    Creatinine 1.23 (H) 0.55 - 1.02 MG/DL    BUN/Creatinine ratio 23 (H) 12 - 20      GFR est AA 54 (L) >60 ml/min/1.73m2    GFR est non-AA 45 (L) >60 ml/min/1.73m2    Calcium 8.8 8.5 - 10.1 MG/DL   CBC WITH AUTOMATED DIFF    Collection Time: 05/24/19  4:47 AM   Result Value Ref Range    WBC 13.0 (H) 3.6 - 11.0 K/uL    RBC 3.50 (L) 3.80 - 5.20 M/uL    HGB 10.5 (L) 11.5 - 16.0 g/dL    HCT 33.4 (L) 35.0 - 47.0 %    MCV 95.4 80.0 - 99.0 FL    MCH 30.0 26.0 - 34.0 PG    MCHC 31.4 30.0 - 36.5 g/dL    RDW 12.3 11.5 - 14.5 %    PLATELET 274 869 - 944 K/uL    MPV 10.3 8.9 - 12.9 FL    NRBC 0.0 0  WBC    ABSOLUTE NRBC 0.00 0.00 - 0.01 K/uL    NEUTROPHILS 80 (H) 32 - 75 %    BAND NEUTROPHILS 9 (H) 0 - 6 %    LYMPHOCYTES 5 (L) 12 - 49 %    MONOCYTES 5 5 - 13 %    EOSINOPHILS 0 0 - 7 %    BASOPHILS 0 0 - 1 %    MYELOCYTES 1 (H) 0 %    IMMATURE GRANULOCYTES 0 %    ABS. NEUTROPHILS 11.6 (H) 1.8 - 8.0 K/UL    ABS. LYMPHOCYTES 0.7 (L) 0.8 - 3.5 K/UL    ABS. MONOCYTES 0.7 0.0 - 1.0 K/UL    ABS. EOSINOPHILS 0.0 0.0 - 0.4 K/UL    ABS. BASOPHILS 0.0 0.0 - 0.1 K/UL    ABS. IMM. GRANS. 0.0 K/UL    DF MANUAL      PLATELET COMMENTS Large Platelets      RBC COMMENTS MACROCYTOSIS  1+         Recent Labs     05/24/19  0447 05/23/19  1603   WBC 13.0* 13.5*   HGB 10.5* 11.5   HCT 33.4* 35.9    228     Recent Labs     05/24/19  0447 05/23/19  1603 05/22/19  0533    136 139   K 4.4 3.4* 3.6   * 104 106   CO2 22 24 24   BUN 28* 29* 18   CREA 1.23* 1.48* 1.11*   * 113* 143*   CA 8.8 8.9 9.2     Recent Labs     05/22/19  0533   SGOT 30   ALT 29   AP 79   TBILI 1.7*   TP 7.3   ALB 3.5   GLOB 3.8   LPSE 90     No results for input(s): INR, PTP, APTT in the last 72 hours. No lab exists for component: INREXT   No results for input(s): FE, TIBC, PSAT, FERR in the last 72 hours. No results found for: FOL, RBCF   No results for input(s): PH, PCO2, PO2 in the last 72 hours. No results for input(s): CPK, CKNDX, TROIQ in the last 72 hours.     No lab exists for component: CPKMB  No results found for: CHOL, CHOLX, CHLST, CHOLV, HDL, LDL, LDLC, DLDLP, TGLX, TRIGL, TRIGP, CHHD, CHHDX  No results found for: GLUCPOC  Lab Results   Component Value Date/Time    Color DARK YELLOW 05/22/2019 05:23 AM    Appearance CLOUDY (A) 05/22/2019 05:23 AM    Specific gravity 1.026 05/22/2019 05:23 AM    pH (UA) 6.0 05/22/2019 05:23 AM    Protein 30 (A) 05/22/2019 05:23 AM Glucose NEGATIVE  05/22/2019 05:23 AM    Ketone NEGATIVE  05/22/2019 05:23 AM    Bilirubin NEGATIVE  05/22/2019 05:23 AM    Urobilinogen 0.2 05/22/2019 05:23 AM    Nitrites NEGATIVE  05/22/2019 05:23 AM    Leukocyte Esterase LARGE (A) 05/22/2019 05:23 AM    Epithelial cells FEW 05/22/2019 05:23 AM    Bacteria NEGATIVE  05/22/2019 05:23 AM    WBC >100 (H) 05/22/2019 05:23 AM    RBC 5-10 05/22/2019 05:23 AM       Med list reviewed  Current Facility-Administered Medications   Medication Dose Route Frequency    losartan (COZAAR) tablet 100 mg  100 mg Oral DAILY    rosuvastatin (CRESTOR) tablet 10 mg  10 mg Oral DAILY    tamsulosin (FLOMAX) capsule 0.4 mg  0.4 mg Oral DAILY    sodium chloride (NS) flush 5-40 mL  5-40 mL IntraVENous Q8H    sodium chloride (NS) flush 5-40 mL  5-40 mL IntraVENous PRN    0.9% sodium chloride infusion  100 mL/hr IntraVENous CONTINUOUS    cefTRIAXone (ROCEPHIN) 1 g in 0.9% sodium chloride (MBP/ADV) 50 mL  1 g IntraVENous Q24H    acetaminophen (TYLENOL) tablet 650 mg  650 mg Oral Q4H PRN    oxyCODONE IR (ROXICODONE) tablet 5 mg  5 mg Oral Q6H PRN    ondansetron (ZOFRAN) injection 4 mg  4 mg IntraVENous Q4H PRN    heparin (porcine) injection 5,000 Units  5,000 Units SubCUTAneous Q8H    0.9% sodium chloride 9,979 mL with folic acid 1 mg, thiamine 100 mg, mvi, adult no. 4 with vit K 10 mL infusion   IntraVENous DAILY       Care Plan discussed with:  Patient/Family, Nurse and     Marcella Mendez MD  Internal Medicine  Date of Service: 5/24/2019

## 2019-05-24 NOTE — PROGRESS NOTES
Nursing supervisor called to say patient does not need to be on remote telemetry since she has been downgraded.  Level of care is surgical.

## 2019-05-24 NOTE — BRIEF OP NOTE
BRIEF OPERATIVE NOTE    Date of Procedure: 5/23/2019   Preoperative Diagnosis: URO-SEPSIS/ LEFT KIDNEY STONE  Postoperative Diagnosis: URO-SEPSIS/ LEFT KIDNEY STONE    Procedure(s):  CYSTOSCOPY URETERAL LEFT STENT INSERTION  Surgeon(s) and Role:     * Donita Feliz MD - Primary         Surgical Assistant: 0    Surgical Staff:  Circ-1: Kat Baldwin RN  Scrub RN-1: Dawson Arriola RN  Scrub RN-Relief: Eladia Davison RN  Event Time In Time Out   Incision Start 1954    Incision Close 2007      Anesthesia: General   Estimated Blood Loss: 0  Specimens: * No specimens in log *   Findings: hydro left. Pus in ureter.    Complications: 0  Implants: * No implants in log *

## 2019-05-24 NOTE — H&P
[address]  HISTORY AND PHYSICAL    Name:  Brionna Katz  MR#:  [id number]  :  1960  ACCOUNT #:  [de-identified]  ADMIT DATE:  2019      CHIEF COMPLAINT:  Left flank pain. HISTORY OF PRESENT ILLNESS:  The patient is a 44-year-old female with past medical history of hypertension, hyperlipidemia, and arthritis, who presents to the hospital with the above-mentioned symptoms. History was primarily obtained from the patient. The patient reports that about 2 days back, she started having some left flank pain associated with dysuria, headache, low-grade fevers, ot concerned and decided to come to the ER yesterday. The patient came to the ER, was diagnosed with a renal stone, was given Flomax, supportive care, and was told to follow up with the urologist.  The patient reports this morning she woke up, had a fever of 102.7 degrees Fahrenheit, continued to have left flank pain, and tried taking her hydrocodone but that did not seem to relieve pain. She also had some increased nausea, lethargy, weakness, got concerned and decided to come to the hospital.  In the hospital, the patient was found to have worsening FIOR associated with tachycardia and possible early SIRS/sepsis, and the patient was requested to be admitted under the hospitalist service. The patient denies any headache, blurry vision, sore throat, trouble swallowing, trouble with speech, chest pain, shortness of breath, cough, does admit to fever but no chills, constipation, diarrhea, focal or generalized neurological weakness, recent travel, sick contacts, falls, injuries, hematemesis, melena, hemoptysis, hematuria, or any other concerns or problems. PAST MEDICAL HISTORY:  See above. HOME MEDICATIONS:  None. SOCIAL HISTORY:  Denies tobacco abuse. Drinks 1-2 glasses of wine on a daily basis. Denies IV drug abuse. Lives at home.     REVIEW OF SYSTEMS:  All systems were reviewed and found to be essentially negative except for the symptoms mentioned above. FAMILY HISTORY:  Discussed and was found to be noncontributory to the present admission. PHYSICAL EXAMINATION:  VITAL SIGNS:  Temperature 98.7, pulse 98, respiratory rate 15, blood pressure 108/71, pulse oximetry 95% on room air. GENERAL:  Alert x3, awake, mildly distressed, pleasant female, appears to be stated age. HEENT:  Pupils equal and reactive to light. Dry mucous membranes. Tympanic membranes clear. NECK:  Supple. CHEST:  Clear to auscultation bilaterally. CORONARY:  S1 and S2 heard. ABDOMEN:  Soft, nontender, nondistended. Bowel sounds are physiological.  EXTREMITIES:  No clubbing, no cyanosis, no edema. NEURO/PSYCH:  Pleasant mood and affect. Cranial nerves II-XII grossly intact. Sensory grossly within normal limits. DTRs 2+ x4. Strength 5/5. SKIN:  Warm. LABORATORY DATA:  White count 13.5, hemoglobin 11.5, hematocrit 35.9, platelets 946. Sodium 136, potassium 3.4, chloride 104, bicarbonate 24, anion gap 8, BUN 29, creatinine 1.48, calcium 8.9, glucose 112. CT of the abdomen and pelvis done yesterday shows obstructing mid left ureteral calculus 4 x 6 mm, moderate hydronephrosis, multiple small nonobstructive bilateral renal calculi, mild hepatic steatosis. ASSESSMENT AND PLAN:  1. Nephrolithiasis with hydronephrosis, at risk for sepsis. Currently meeting systemic inflammatory response syndrome criteria. The patient will be admitted on a telemetry bed. We will start the patient on IV hydration, broad-spectrum IV antibiotics. Urology has been consulted and their recommendations will be incorporated. We will provide pain control and further intervention per hospital course. Continue to closely monitor and reassess as needed. 2.  History of hypertension. Continue to monitor. Further intervention per hospital course. 3.  History of alcohol abuse. The patient will be on WA protocol, banana bag, Ativan p.r.n.   Further intervention per hospital course. 4.  Gastrointestinal and deep venous thrombosis prophylaxis. The patient will be on heparin.       Gala Herbert MD      [id number]  D:  05/23/2019 18:37  T:  05/23/2019 19:44  JOB #:  [id number]

## 2019-05-24 NOTE — PROGRESS NOTES
Reason for Admission:   UTI                   RRAT Score:       2              Plan for utilizing home health:      Not Likely                    Current Advanced Directive/Advance Care Plan: FULL CODE; not on file    Likelihood of Readmission:  Low                         Transition of Care Plan:         Patient has no hx of HH, IPR, or SNF. [facility]acy preference [facility]. NOTE: Patient's home address is: [address] [place] [address], [facility]  Patient has no transitions of care recommendations. Patient's significant other, Jenniffer Davis, will be able to stay with patient consistently through Monday. Care Management Interventions  PCP Verified by CM: Yes(last seen by Dr. Ellie Shepard a year ago)  Palliative Care Criteria Met (RRAT>21 & CHF Dx)?: No  Mode of Transport at Discharge:  Other (see comment)(significant other to transport)  MyChart Signup: Yes  Discharge Durable Medical Equipment: No(has no DME)  Health Maintenance Reviewed: Yes(CM met with patient, with patient alert and oriented x 4)  Physical Therapy Consult: Yes  Occupational Therapy Consult: Yes  Speech Therapy Consult: No  Current Support Network: Lives with Spouse, Own Home  Confirm Follow Up Transport: Self(independent in ADLs, to include driving)  Plan discussed with Pt/Family/Caregiver: Yes  [employer] Information Provided?: No  Discharge Location  Discharge Placement: Home with family assistance(Lives with significant other in a 2 story home, 2 exterior steps, 2nd floor bedroom, 12 interior steps)    CRM: Lorenza Parkre, MPH, [address] [place] [address]; Z: [phone]

## 2019-05-24 NOTE — PROGRESS NOTES
Afebrile. Still feels washed out. Had pyonephrosis left kidney. [id number] [address] for dc when medically stable on appropriate abiotic w plans for fu va urol in 1-2 weeks to discuss stone removal.  Loya can come out at any time prior to dc.

## 2019-05-24 NOTE — PROGRESS NOTES
The patient is due to have the carey removed, discharge pending, & patient to work with physical therapy.

## 2019-05-24 NOTE — PROGRESS NOTES
Orders acknowledged and chart reviewed. Noted per PT, patient close to functional baseline at this time. Entered patient room and explained role of OT. Patient voicing no concerns regarding ADL or IADL tasks at this time and stating she has been going to the bathroom independently. No skilled acute OT needs at this time. Please re-consult if there is a change in patient functional status. Thank you.

## 2019-05-24 NOTE — PROGRESS NOTES
physical Therapy EVALUATION/DISCHARGE  Patient: Yasir Ramirez (44 y.o. female)  Date: 5/24/2019  Primary Diagnosis: UTI (urinary tract infection) [N39.0]  Procedure(s) (LRB):  CYSTOSCOPY URETERAL LEFT STENT INSERTION (Left) 1 Day Post-Op   Precautions:      ASSESSMENT :  Based on the objective data described below, the patient presents with near baseline level of function. Prior to admit patient was completely independent with mobility. Currently modified independent with bed mobility and transfers. Amb approx 250 feet with SBA with reports of some dizziness but no overt LOB. Patient is safe to DC with family or staff at this time. Does not need further PT services and is cleared for DC from mobility standpoint. Further skilled acute physical therapy is not indicated at this time. PLAN :  Discharge Recommendations: None  Further Equipment Recommendations for Discharge: none     SUBJECTIVE:   Patient stated I wasn't planning on all this for my Memorial Day weekend.     OBJECTIVE DATA SUMMARY:   HISTORY:    Past Medical History:   Diagnosis Date    Arthritis     High cholesterol     Hypertension      Past Surgical History:   Procedure Laterality Date    HX ORTHOPAEDIC      plantar tendon release     Prior Level of Function/Home Situation: Independent with mobility at baseline  Personal factors and/or comorbidities impacting plan of care:     Home Situation  Home Environment: Private residence  # Steps to Enter: 2  Rails to Enter: Yes  One/Two Story Residence: Two story  Living Alone: No  Support Systems: Spouse/Significant Other/Partner  Patient Expects to be Discharged to[de-identified] Private residence  Current DME Used/Available at Home: None  Tub or Shower Type: Tub/Shower combination    EXAMINATION/PRESENTATION/DECISION MAKING:   Critical Behavior:  Neurologic State: Alert  Orientation Level: Oriented X4        Hearing:   Auditory  Auditory Impairment: None    Range Of Motion:  AROM: Generally decreased, functional                       Strength:    Strength: Generally decreased, functional                    Functional Mobility:  Bed Mobility:  Rolling: Modified independent  Supine to Sit: Modified independent  Sit to Supine: Modified independent     Transfers:  Sit to Stand: Modified independent  Stand to Sit: Modified independent                       Balance:   Sitting: Intact  Standing: Intact  Ambulation/Gait Training:  Distance (ft): 250 Feet (ft)  Assistive Device: Gait belt  Ambulation - Level of Assistance: Stand-by assistance     Gait Description (WDL): Exceptions to WDL  Gait Abnormalities: Decreased step clearance        Base of Support: Widened     Speed/Autumn: Pace decreased (<100 feet/min); Slow         Pain:  Pain Scale 1: Numeric (0 - 10)  Pain Intensity 1: 0     Activity Tolerance:   VSS  Please refer to the flowsheet for vital signs taken during this treatment. After treatment:   []   Patient left in no apparent distress sitting up in chair  [x]   Patient left in no apparent distress in bed  [x]   Call bell left within reach  [x]   Nursing notified  []   Caregiver present  []   Bed alarm activated    COMMUNICATION/EDUCATION:   Communication/Collaboration:  [x]   Fall prevention education was provided and the patient/caregiver indicated understanding. [x]   Patient/family have participated as able and agree with findings and recommendations. []   Patient is unable to participate in plan of care at this time.   Findings and recommendations were discussed with: Physical Therapist, Occupational Therapist and Registered Nurse    Thank you for this referral.  Evelia Chandra PT, DPT   Time Calculation: 20 mins

## 2019-05-24 NOTE — PROGRESS NOTES
Bedside shift change report given to Lucina Goodman (oncoming nurse) by Jesse RN (offgoing nurse). Report included the following information SBAR, Kardex, Intake/Output and MAR.

## 2019-05-24 NOTE — PERIOP NOTES
TRANSFER - OUT REPORT:    Verbal report given to Killian Aaron RN(name) on Shira Almanzar  being transferred to [facility](unit) for routine post - op       Report consisted of patients Situation, Background, Assessment and   Recommendations(SBAR). Information from the following report(s) SBAR, Kardex, ED Summary, OR Summary, Procedure Summary, Intake/Output and Cardiac Rhythm NSR/Sinus Tach was reviewed with the receiving nurse. Lines:   Peripheral IV 05/23/19 Right Antecubital (Active)   Site Assessment Clean, dry, & intact 5/23/2019  8:23 PM   Phlebitis Assessment 0 5/23/2019  8:23 PM   Infiltration Assessment 0 5/23/2019  8:23 PM   Dressing Status Clean, dry, & intact 5/23/2019  8:23 PM   Dressing Type Transparent;Tape 5/23/2019  8:23 PM   Hub Color/Line Status Pink;Flushed;Capped 5/23/2019  8:23 PM   Action Taken Open ports on tubing capped 5/23/2019  8:23 PM   Alcohol Cap Used Yes 5/23/2019  8:23 PM       Peripheral IV 05/23/19 Left Antecubital (Active)   Site Assessment Clean, dry, & intact 5/23/2019  8:23 PM   Phlebitis Assessment 0 5/23/2019  8:23 PM   Infiltration Assessment 0 5/23/2019  8:23 PM   Dressing Status Clean, dry, & intact 5/23/2019  8:23 PM   Dressing Type Transparent;Tape 5/23/2019  8:23 PM   Hub Color/Line Status Pink;Flushed;Capped 5/23/2019  8:23 PM   Action Taken Open ports on tubing capped 5/23/2019  8:23 PM   Alcohol Cap Used Yes 5/23/2019  8:23 PM       Peripheral IV 05/23/19 Right Hand (Active)   Site Assessment Clean, dry, & intact 5/23/2019  8:23 PM   Phlebitis Assessment 0 5/23/2019  8:23 PM   Infiltration Assessment 0 5/23/2019  8:23 PM   Dressing Status Clean, dry, & intact 5/23/2019  8:23 PM   Dressing Type Transparent;Tape 5/23/2019  8:23 PM   Hub Color/Line Status Green; Infusing 5/23/2019  8:23 PM   Action Taken Open ports on tubing capped 5/23/2019  8:23 PM   Alcohol Cap Used Yes 5/23/2019  8:23 PM        Opportunity for questions and clarification was provided.       Patient transported with:   Monitor  O2 @ 2 liters  Registered Nurse  [facility]

## 2019-05-25 ENCOUNTER — APPOINTMENT (OUTPATIENT)
Dept: CT IMAGING | Age: 59
DRG: 853 | End: 2019-05-25
Attending: INTERNAL MEDICINE
Payer: OTHER GOVERNMENT

## 2019-05-25 ENCOUNTER — APPOINTMENT (OUTPATIENT)
Dept: ULTRASOUND IMAGING | Age: 59
DRG: 853 | End: 2019-05-25
Attending: INTERNAL MEDICINE
Payer: OTHER GOVERNMENT

## 2019-05-25 LAB
ANION GAP SERPL CALC-SCNC: 7 MMOL/L (ref 5–15)
BASOPHILS # BLD: 0 K/UL (ref 0–0.1)
BASOPHILS NFR BLD: 0 % (ref 0–1)
BUN SERPL-MCNC: 30 MG/DL (ref 6–20)
BUN/CREAT SERPL: 26 (ref 12–20)
CALCIUM SERPL-MCNC: 8.5 MG/DL (ref 8.5–10.1)
CHLORIDE SERPL-SCNC: 114 MMOL/L (ref 97–108)
CO2 SERPL-SCNC: 21 MMOL/L (ref 21–32)
CREAT SERPL-MCNC: 1.16 MG/DL (ref 0.55–1.02)
DIFFERENTIAL METHOD BLD: ABNORMAL
EOSINOPHIL # BLD: 0.2 K/UL (ref 0–0.4)
EOSINOPHIL NFR BLD: 1 % (ref 0–7)
ERYTHROCYTE [DISTWIDTH] IN BLOOD BY AUTOMATED COUNT: 12.5 % (ref 11.5–14.5)
GLUCOSE SERPL-MCNC: 108 MG/DL (ref 65–100)
HCT VFR BLD AUTO: 30.3 % (ref 35–47)
HGB BLD-MCNC: 9.7 G/DL (ref 11.5–16)
IMM GRANULOCYTES # BLD AUTO: 0 K/UL
IMM GRANULOCYTES NFR BLD AUTO: 0 %
LACTATE SERPL-SCNC: 0.9 MMOL/L (ref 0.4–2)
LYMPHOCYTES # BLD: 0.4 K/UL (ref 0.8–3.5)
LYMPHOCYTES NFR BLD: 2 % (ref 12–49)
MCH RBC QN AUTO: 29.9 PG (ref 26–34)
MCHC RBC AUTO-ENTMCNC: 32 G/DL (ref 30–36.5)
MCV RBC AUTO: 93.5 FL (ref 80–99)
MONOCYTES # BLD: 0.5 K/UL (ref 0–1)
MONOCYTES NFR BLD: 3 % (ref 5–13)
NEUTS BAND NFR BLD MANUAL: 5 % (ref 0–6)
NEUTS SEG # BLD: 16.8 K/UL (ref 1.8–8)
NEUTS SEG NFR BLD: 89 % (ref 32–75)
NRBC # BLD: 0 K/UL (ref 0–0.01)
NRBC BLD-RTO: 0 PER 100 WBC
PLATELET # BLD AUTO: 220 K/UL (ref 150–400)
PMV BLD AUTO: 10.8 FL (ref 8.9–12.9)
POTASSIUM SERPL-SCNC: 3.7 MMOL/L (ref 3.5–5.1)
PROCALCITONIN SERPL-MCNC: 2.2 NG/ML
RBC # BLD AUTO: 3.24 M/UL (ref 3.8–5.2)
RBC MORPH BLD: ABNORMAL
RBC MORPH BLD: ABNORMAL
SODIUM SERPL-SCNC: 142 MMOL/L (ref 136–145)
WBC # BLD AUTO: 17.9 K/UL (ref 3.6–11)

## 2019-05-25 PROCEDURE — 74176 CT ABD & PELVIS W/O CONTRAST: CPT

## 2019-05-25 PROCEDURE — 36415 COLL VENOUS BLD VENIPUNCTURE: CPT

## 2019-05-25 PROCEDURE — 76705 ECHO EXAM OF ABDOMEN: CPT

## 2019-05-25 PROCEDURE — 74011250636 HC RX REV CODE- 250/636: Performed by: FAMILY MEDICINE

## 2019-05-25 PROCEDURE — 74011250636 HC RX REV CODE- 250/636: Performed by: INTERNAL MEDICINE

## 2019-05-25 PROCEDURE — 84145 PROCALCITONIN (PCT): CPT

## 2019-05-25 PROCEDURE — 83605 ASSAY OF LACTIC ACID: CPT

## 2019-05-25 PROCEDURE — 74011000258 HC RX REV CODE- 258: Performed by: INTERNAL MEDICINE

## 2019-05-25 PROCEDURE — 85025 COMPLETE CBC W/AUTO DIFF WBC: CPT

## 2019-05-25 PROCEDURE — 80048 BASIC METABOLIC PNL TOTAL CA: CPT

## 2019-05-25 PROCEDURE — 74011250637 HC RX REV CODE- 250/637: Performed by: FAMILY MEDICINE

## 2019-05-25 PROCEDURE — 65270000029 HC RM PRIVATE

## 2019-05-25 PROCEDURE — 74011250637 HC RX REV CODE- 250/637: Performed by: UROLOGY

## 2019-05-25 RX ADMIN — PIPERACILLIN SODIUM,TAZOBACTAM SODIUM 3.38 G: 3; .375 INJECTION, POWDER, FOR SOLUTION INTRAVENOUS at 13:12

## 2019-05-25 RX ADMIN — SODIUM CHLORIDE 100 ML/HR: 900 INJECTION, SOLUTION INTRAVENOUS at 00:55

## 2019-05-25 RX ADMIN — OXYCODONE HYDROCHLORIDE 5 MG: 5 TABLET ORAL at 13:32

## 2019-05-25 RX ADMIN — PIPERACILLIN SODIUM,TAZOBACTAM SODIUM 3.38 G: 3; .375 INJECTION, POWDER, FOR SOLUTION INTRAVENOUS at 21:19

## 2019-05-25 RX ADMIN — LOSARTAN POTASSIUM 100 MG: 50 TABLET ORAL at 09:21

## 2019-05-25 RX ADMIN — HEPARIN SODIUM 5000 UNITS: 5000 INJECTION INTRAVENOUS; SUBCUTANEOUS at 17:52

## 2019-05-25 RX ADMIN — HEPARIN SODIUM 5000 UNITS: 5000 INJECTION INTRAVENOUS; SUBCUTANEOUS at 09:22

## 2019-05-25 RX ADMIN — TAMSULOSIN HYDROCHLORIDE 0.4 MG: 0.4 CAPSULE ORAL at 09:22

## 2019-05-25 RX ADMIN — ACETAMINOPHEN 650 MG: 325 TABLET ORAL at 01:09

## 2019-05-25 RX ADMIN — HEPARIN SODIUM 5000 UNITS: 5000 INJECTION INTRAVENOUS; SUBCUTANEOUS at 00:55

## 2019-05-25 RX ADMIN — Medication 10 ML: at 07:10

## 2019-05-25 RX ADMIN — Medication 10 ML: at 13:23

## 2019-05-25 RX ADMIN — ROSUVASTATIN CALCIUM 10 MG: 10 TABLET, FILM COATED ORAL at 09:21

## 2019-05-25 RX ADMIN — OXYCODONE HYDROCHLORIDE 5 MG: 5 TABLET ORAL at 19:36

## 2019-05-25 NOTE — CONSULTS
Surgery Consult    Subjective:      Renu Shoemaker is a 63 y.o. female who presented with LLQ abdominal and flank pain. With this she did have some nausea and vomiting but not recently. She was diagnosed with a kidney stone and underwent a stent placement. She did have urosepsis at the time of surgery. She still had an elevated WBC and CT scan was done today which was concerning for cholecystitis. She denies any right sided abdominal pain except where she is being stuck with lovenox. She usually avoids fried and fatty foods but does not have any specific probelems with them. She has been eating since surgery and has had no pain. Patient Active Problem List    Diagnosis Date Noted    UTI (urinary tract infection) 83/55/0369    Renal colic 23/97/0687     Past Medical History:   Diagnosis Date    Arthritis     High cholesterol     Hypertension       Past Surgical History:   Procedure Laterality Date    HX ORTHOPAEDIC      plantar tendon release    Bladder/ureter surgery   Fisher teeth. Social History     Tobacco Use    Smoking status: Never Smoker    Smokeless tobacco: Never Used   Substance Use Topics    Alcohol use: Yes     Alcohol/week: 8.4 oz     Types: 14 Glasses of wine per week     Frequency: Never      History reviewed. No pertinent family history.    Current Facility-Administered Medications   Medication Dose Route Frequency    piperacillin-tazobactam (ZOSYN) 3.375 g in 0.9% sodium chloride (MBP/ADV) 100 mL  3.375 g IntraVENous Q8H    losartan (COZAAR) tablet 100 mg  100 mg Oral DAILY    rosuvastatin (CRESTOR) tablet 10 mg  10 mg Oral DAILY    tamsulosin (FLOMAX) capsule 0.4 mg  0.4 mg Oral DAILY    sodium chloride (NS) flush 5-40 mL  5-40 mL IntraVENous Q8H    sodium chloride (NS) flush 5-40 mL  5-40 mL IntraVENous PRN    0.9% sodium chloride infusion  100 mL/hr IntraVENous CONTINUOUS    acetaminophen (TYLENOL) tablet 650 mg  650 mg Oral Q4H PRN    oxyCODONE IR (ROXICODONE) tablet 5 mg 5 mg Oral Q6H PRN    ondansetron (ZOFRAN) injection 4 mg  4 mg IntraVENous Q4H PRN    heparin (porcine) injection 5,000 Units  5,000 Units SubCUTAneous Q8H      Allergies   Allergen Reactions    Codeine Nausea Only    Percocet [Oxycodone-Acetaminophen] Nausea Only    Percodan [Oxycodone Hcl-Oxycodone-Asa] Nausea Only       Review of Systems:    Constitutional: positive for fevers and chills  Eyes: negative  Ears, Nose, Mouth, Throat, and Face: negative  Respiratory: negative  Cardiovascular: negative  Gastrointestinal: negative  Genitourinary:positive for kidney stone   Integument/Breast: negative  Hematologic/Lymphatic: negative  Musculoskeletal:positive for back pain  Neurological: negative  Behavioral/Psychiatric: negative  Endocrine: negative  Allergic/Immunologic: negative    Objective:        Visit Vitals  /81 (BP 1 Location: Right arm, BP Patient Position: At rest)   Pulse 87   Temp 99.2 °F (37.3 °C)   Resp 16   Wt 149 lb 14.6 oz (68 kg)   LMP 01/05/2018   SpO2 96%   BMI 24.95 kg/m²       Physical Exam:  GENERAL: alert, cooperative, no distress, appears stated age, EYE: negative, THROAT & NECK: normal, LUNG: clear to auscultation bilaterally, HEART: regular rate and rhythm, ABDOMEN: soft, non-tender. Bowel sounds normal. No masses,  no organomegaly, EXTREMITIES:  extremities normal, atraumatic, no cyanosis or edema, no edema, SKIN: Normal., NEUROLOGIC: negative, PSYCH: non focal    Imaging:  images and reports reviewed  CT-Interval decompression of left hydronephrosis with ureteral stent in place. New left lower lobe subsegmental atelectasis versus pneumonia  Gallbladder wall thickening versus pericholecystic fluid. Consider  cholecystitis. Lab/Data Review: All lab results for the last 24 hours reviewed.     Recent Results (from the past 24 hour(s))   CBC WITH AUTOMATED DIFF    Collection Time: 05/25/19  4:32 AM   Result Value Ref Range    WBC 17.9 (H) 3.6 - 11.0 K/uL    RBC 3.24 (L) 3.80 - 5.20 M/uL    HGB 9.7 (L) 11.5 - 16.0 g/dL    HCT 30.3 (L) 35.0 - 47.0 %    MCV 93.5 80.0 - 99.0 FL    MCH 29.9 26.0 - 34.0 PG    MCHC 32.0 30.0 - 36.5 g/dL    RDW 12.5 11.5 - 14.5 %    PLATELET 950 844 - 124 K/uL    MPV 10.8 8.9 - 12.9 FL    NRBC 0.0 0  WBC    ABSOLUTE NRBC 0.00 0.00 - 0.01 K/uL    NEUTROPHILS 89 (H) 32 - 75 %    BAND NEUTROPHILS 5 0 - 6 %    LYMPHOCYTES 2 (L) 12 - 49 %    MONOCYTES 3 (L) 5 - 13 %    EOSINOPHILS 1 0 - 7 %    BASOPHILS 0 0 - 1 %    IMMATURE GRANULOCYTES 0 %    ABS. NEUTROPHILS 16.8 (H) 1.8 - 8.0 K/UL    ABS. LYMPHOCYTES 0.4 (L) 0.8 - 3.5 K/UL    ABS. MONOCYTES 0.5 0.0 - 1.0 K/UL    ABS. EOSINOPHILS 0.2 0.0 - 0.4 K/UL    ABS. BASOPHILS 0.0 0.0 - 0.1 K/UL    ABS. IMM. GRANS. 0.0 K/UL    DF MANUAL      RBC COMMENTS SANTIAGO CELLS  PRESENT        RBC COMMENTS OVALOCYTES  PRESENT       METABOLIC PANEL, BASIC    Collection Time: 05/25/19  4:32 AM   Result Value Ref Range    Sodium 142 136 - 145 mmol/L    Potassium 3.7 3.5 - 5.1 mmol/L    Chloride 114 (H) 97 - 108 mmol/L    CO2 21 21 - 32 mmol/L    Anion gap 7 5 - 15 mmol/L    Glucose 108 (H) 65 - 100 mg/dL    BUN 30 (H) 6 - 20 MG/DL    Creatinine 1.16 (H) 0.55 - 1.02 MG/DL    BUN/Creatinine ratio 26 (H) 12 - 20      GFR est AA 58 (L) >60 ml/min/1.73m2    GFR est non-AA 48 (L) >60 ml/min/1.73m2    Calcium 8.5 8.5 - 10.1 MG/DL   LACTIC ACID    Collection Time: 05/25/19  2:56 PM   Result Value Ref Range    Lactic acid 0.9 0.4 - 2.0 MMOL/L       Assessment:     Possible Cholecystitis seen on CT    Plan:   The patient does not have any symptoms of acute cholecystitis . Additionally she has been able to eat without difficulty after her stent placement. Her Ultrasound is pending- If this is normal no further workup is needed. If it is suspicious she will need a HIDA scan before proceeding with Lap Kalyn. ADDENDUM 1930  U/s Reviewed - No sign of cholecystitis  Or gallstones   No Need for Lap Kalyn.    Diet as tolerated Call if there are any questions. Will sign off.

## 2019-05-25 NOTE — PROGRESS NOTES
Hospitalist Progress Note      Hospital summary: The patient is a 61-year-old female with past medical history of hypertension, hyperlipidemia, and arthritis, who presents to the hospital with the left flank pain. The patient reports this morning she woke up, had a fever of 102.7 degrees Fahrenheit, continued to have left flank pain, and tried taking her hydrocodone but that did not seem to relieve pain. She also had some increased nausea, lethargy, weakness, got concerned and decided to come to the hospital.  In the hospital, the patient was found to have worsening FIOR associated with tachycardia and possible early SIRS/sepsis, and the patient was requested to be admitted under the hospitalist service. . 5/23/2019      Assessment/Plan:   Nephrolithiasis with hydronephrosis s/p stent placement on 5/23  - urology on board  -  IV hydration  - urine cx: E coli  - Blood cx: NGTD  - on flomax  - voiding ok  - abx changed to IV zosyn     Leucocytosis - worsened 17.9  - Rpt CT abd ordered to r/o any abscess  - CT abd: Interval decompression of left hydronephrosis with ureteral stent in place. New left lower lobe subsegmental atelectasis versus pneumonia. Gallbladder wall thickening versus pericholecystic fluid. Consider cholecystitis    Possible cholecystitis:  - no ruq abd pain, tolerating diet  -ordered RUQ abd us  - appreciate general surgery input    PNA va atelectasis  - normal lactic acid  - pro calcitonin ordered  - abx as above  - Incentive spirometry    FIOR - improving  - 2/2 above  - c/w IVF  - will monitor    Hypertension-  c/w losartan    Alcohol abuse   - she drinks 1-2 glasses of wine/ cocktails daily  - CIWA protocol, Ativan p.r.n.    - counseled to quit    Code status: Full  DVT prophylaxis: Heparin  Disposition: TBD.  ----------------------------------------------    CC: Left flank pain. S: Patient is seen and examined at bedside.  She still has left flank pain but improved. Voiding ok. No other complaints. ldiscussed regarding WBC elevation. CT abd ordered. Family present. CT abd report discussed again with patient and plan as above discussed     Review of Systems:  A comprehensive review of systems was negative. O:  Visit Vitals  /81 (BP 1 Location: Right arm, BP Patient Position: At rest)   Pulse 87   Temp 99.2 °F (37.3 °C)   Resp 16   Wt 68 kg (149 lb 14.6 oz)   LMP 01/05/2018   SpO2 96%   BMI 24.95 kg/m²       PHYSICAL EXAM:  Gen: NAD  HEENT: anicteric sclerae, normal conjunctiva, oropharynx clear, MM moist  Neck: supple, trachea midline, no adenopathy  Heart: RRR, no MRG, no JVD, no peripheral edema  Lungs: CTA b/l, non-labored respirations  Abd: soft, no RUQ tenderness. mild tenderness left flank, BS+  Extr: warm  Skin: dry, no rash  Neuro: CN II-XII grossly intact, normal speech, moves all extremities  Psych: normal mood, appropriate affect      Intake/Output Summary (Last 24 hours) at 5/25/2019 1628  Last data filed at 5/25/2019 1312  Gross per 24 hour   Intake 615 ml   Output 850 ml   Net -235 ml        Recent labs & imaging reviewed:  Recent Results (from the past 24 hour(s))   CBC WITH AUTOMATED DIFF    Collection Time: 05/25/19  4:32 AM   Result Value Ref Range    WBC 17.9 (H) 3.6 - 11.0 K/uL    RBC 3.24 (L) 3.80 - 5.20 M/uL    HGB 9.7 (L) 11.5 - 16.0 g/dL    HCT 30.3 (L) 35.0 - 47.0 %    MCV 93.5 80.0 - 99.0 FL    MCH 29.9 26.0 - 34.0 PG    MCHC 32.0 30.0 - 36.5 g/dL    RDW 12.5 11.5 - 14.5 %    PLATELET 971 579 - 208 K/uL    MPV 10.8 8.9 - 12.9 FL    NRBC 0.0 0  WBC    ABSOLUTE NRBC 0.00 0.00 - 0.01 K/uL    NEUTROPHILS 89 (H) 32 - 75 %    BAND NEUTROPHILS 5 0 - 6 %    LYMPHOCYTES 2 (L) 12 - 49 %    MONOCYTES 3 (L) 5 - 13 %    EOSINOPHILS 1 0 - 7 %    BASOPHILS 0 0 - 1 %    IMMATURE GRANULOCYTES 0 %    ABS. NEUTROPHILS 16.8 (H) 1.8 - 8.0 K/UL    ABS. LYMPHOCYTES 0.4 (L) 0.8 - 3.5 K/UL    ABS. MONOCYTES 0.5 0.0 - 1.0 K/UL    ABS.  EOSINOPHILS 0.2 0.0 - 0.4 K/UL    ABS. BASOPHILS 0.0 0.0 - 0.1 K/UL    ABS. IMM. GRANS. 0.0 K/UL    DF MANUAL      RBC COMMENTS SANTIAGO CELLS  PRESENT        RBC COMMENTS OVALOCYTES  PRESENT       METABOLIC PANEL, BASIC    Collection Time: 05/25/19  4:32 AM   Result Value Ref Range    Sodium 142 136 - 145 mmol/L    Potassium 3.7 3.5 - 5.1 mmol/L    Chloride 114 (H) 97 - 108 mmol/L    CO2 21 21 - 32 mmol/L    Anion gap 7 5 - 15 mmol/L    Glucose 108 (H) 65 - 100 mg/dL    BUN 30 (H) 6 - 20 MG/DL    Creatinine 1.16 (H) 0.55 - 1.02 MG/DL    BUN/Creatinine ratio 26 (H) 12 - 20      GFR est AA 58 (L) >60 ml/min/1.73m2    GFR est non-AA 48 (L) >60 ml/min/1.73m2    Calcium 8.5 8.5 - 10.1 MG/DL   LACTIC ACID    Collection Time: 05/25/19  2:56 PM   Result Value Ref Range    Lactic acid 0.9 0.4 - 2.0 MMOL/L     Recent Labs     05/25/19  0432 05/24/19 0447   WBC 17.9* 13.0*   HGB 9.7* 10.5*   HCT 30.3* 33.4*    187     Recent Labs     05/25/19  0432 05/24/19 0447 05/23/19  1603    139 136   K 3.7 4.4 3.4*   * 112* 104   CO2 21 22 24   BUN 30* 28* 29*   CREA 1.16* 1.23* 1.48*   * 162* 113*   CA 8.5 8.8 8.9     No results for input(s): SGOT, GPT, ALT, AP, TBIL, TBILI, TP, ALB, GLOB, GGT, AML, LPSE in the last 72 hours. No lab exists for component: AMYP, HLPSE  No results for input(s): INR, PTP, APTT in the last 72 hours. No lab exists for component: INREXT, INREXT   No results for input(s): FE, TIBC, PSAT, FERR in the last 72 hours. No results found for: FOL, RBCF   No results for input(s): PH, PCO2, PO2 in the last 72 hours. No results for input(s): CPK, CKNDX, TROIQ in the last 72 hours.     No lab exists for component: CPKMB  No results found for: CHOL, CHOLX, CHLST, CHOLV, HDL, LDL, LDLC, DLDLP, TGLX, TRIGL, TRIGP, CHHD, CHHDX  No results found for: GLUCPOC  Lab Results   Component Value Date/Time    Color DARK YELLOW 05/22/2019 05:23 AM    Appearance CLOUDY (A) 05/22/2019 05:23 AM    Specific gravity 1.026 05/22/2019 05:23 AM    pH (UA) 6.0 05/22/2019 05:23 AM    Protein 30 (A) 05/22/2019 05:23 AM    Glucose NEGATIVE  05/22/2019 05:23 AM    Ketone NEGATIVE  05/22/2019 05:23 AM    Bilirubin NEGATIVE  05/22/2019 05:23 AM    Urobilinogen 0.2 05/22/2019 05:23 AM    Nitrites NEGATIVE  05/22/2019 05:23 AM    Leukocyte Esterase LARGE (A) 05/22/2019 05:23 AM    Epithelial cells FEW 05/22/2019 05:23 AM    Bacteria NEGATIVE  05/22/2019 05:23 AM    WBC >100 (H) 05/22/2019 05:23 AM    RBC 5-10 05/22/2019 05:23 AM       Med list reviewed  Current Facility-Administered Medications   Medication Dose Route Frequency    piperacillin-tazobactam (ZOSYN) 3.375 g in 0.9% sodium chloride (MBP/ADV) 100 mL  3.375 g IntraVENous Q8H    losartan (COZAAR) tablet 100 mg  100 mg Oral DAILY    rosuvastatin (CRESTOR) tablet 10 mg  10 mg Oral DAILY    tamsulosin (FLOMAX) capsule 0.4 mg  0.4 mg Oral DAILY    sodium chloride (NS) flush 5-40 mL  5-40 mL IntraVENous Q8H    sodium chloride (NS) flush 5-40 mL  5-40 mL IntraVENous PRN    0.9% sodium chloride infusion  100 mL/hr IntraVENous CONTINUOUS    acetaminophen (TYLENOL) tablet 650 mg  650 mg Oral Q4H PRN    oxyCODONE IR (ROXICODONE) tablet 5 mg  5 mg Oral Q6H PRN    ondansetron (ZOFRAN) injection 4 mg  4 mg IntraVENous Q4H PRN    heparin (porcine) injection 5,000 Units  5,000 Units SubCUTAneous Q8H       Care Plan discussed with:  Patient/Family, Nurse and     Sudheer Agarwal MD  Internal Medicine  Date of Service: 5/25/2019

## 2019-05-25 NOTE — PROGRESS NOTES
Bedside shift change report given to ISAURA Marquis (oncoming nurse) by ISAURA Sidhu (offgoing nurse). Report included the following information SBAR.

## 2019-05-25 NOTE — PROGRESS NOTES
Urology Progress Note    Subjective:     Daily Progress Note: 2019 9:21 AM    Chicho Sheets is doing better post stent placement. Still with minor discomfort. WBC slightly higher today. Objective:     Visit Vitals  /81 (BP 1 Location: Right arm, BP Patient Position: At rest)   Pulse 87   Temp 99.2 °F (37.3 °C)   Resp 16   LMP 2018   SpO2 96%        Temp (24hrs), Av.5 °F (36.9 °C), Min:98.1 °F (36.7 °C), Max:99.2 °F (37.3 °C)      Intake and Output:   1901 -  0700  In: 1115 [I.V.:1115]  Out: 1430 [Urine:1430]  No intake/output data recorded. Physical Exam:   General appearance: alert, cooperative, no distress, appears stated age  Lungs: breathing easily      Lab/Data Review:  BMP:   Lab Results   Component Value Date/Time     2019 04:32 AM    K 3.7 2019 04:32 AM     (H) 2019 04:32 AM    CO2 21 2019 04:32 AM    AGAP 7 2019 04:32 AM     (H) 2019 04:32 AM    BUN 30 (H) 2019 04:32 AM    CREA 1.16 (H) 2019 04:32 AM    GFRAA 58 (L) 2019 04:32 AM    GFRNA 48 (L) 2019 04:32 AM     CBC:   Lab Results   Component Value Date/Time    WBC 17.9 (H) 2019 04:32 AM    HGB 9.7 (L) 2019 04:32 AM    HCT 30.3 (L) 2019 04:32 AM     2019 04:32 AM       Assessment/Plan:     Active Problems:    UTI (urinary tract infection) (2019)        Plan:  Doing well and continue with treatment. OK to discharge from urology perspective if CT does not show any other concerning findings. Patient has been given names of [facility] physicians to call for f/u appointment to treat stone once her infection has cleared. Will be available if needed.

## 2019-05-26 LAB
ANION GAP SERPL CALC-SCNC: 8 MMOL/L (ref 5–15)
BASOPHILS # BLD: 0.2 K/UL (ref 0–0.1)
BASOPHILS NFR BLD: 1 % (ref 0–1)
BUN SERPL-MCNC: 13 MG/DL (ref 6–20)
BUN/CREAT SERPL: 14 (ref 12–20)
CALCIUM SERPL-MCNC: 8.6 MG/DL (ref 8.5–10.1)
CHLORIDE SERPL-SCNC: 112 MMOL/L (ref 97–108)
CO2 SERPL-SCNC: 20 MMOL/L (ref 21–32)
CREAT SERPL-MCNC: 0.93 MG/DL (ref 0.55–1.02)
DIFFERENTIAL METHOD BLD: ABNORMAL
EOSINOPHIL # BLD: 0.2 K/UL (ref 0–0.4)
EOSINOPHIL NFR BLD: 1 % (ref 0–7)
ERYTHROCYTE [DISTWIDTH] IN BLOOD BY AUTOMATED COUNT: 12.8 % (ref 11.5–14.5)
GLUCOSE SERPL-MCNC: 98 MG/DL (ref 65–100)
HCT VFR BLD AUTO: 29.7 % (ref 35–47)
HGB BLD-MCNC: 9.6 G/DL (ref 11.5–16)
IMM GRANULOCYTES # BLD AUTO: 0 K/UL
IMM GRANULOCYTES NFR BLD AUTO: 0 %
LYMPHOCYTES # BLD: 1.6 K/UL (ref 0.8–3.5)
LYMPHOCYTES NFR BLD: 9 % (ref 12–49)
MCH RBC QN AUTO: 30 PG (ref 26–34)
MCHC RBC AUTO-ENTMCNC: 32.3 G/DL (ref 30–36.5)
MCV RBC AUTO: 92.8 FL (ref 80–99)
MONOCYTES # BLD: 1.2 K/UL (ref 0–1)
MONOCYTES NFR BLD: 7 % (ref 5–13)
NEUTS BAND NFR BLD MANUAL: 5 % (ref 0–6)
NEUTS SEG # BLD: 14.1 K/UL (ref 1.8–8)
NEUTS SEG NFR BLD: 77 % (ref 32–75)
NRBC # BLD: 0 K/UL (ref 0–0.01)
NRBC BLD-RTO: 0 PER 100 WBC
PLATELET # BLD AUTO: 238 K/UL (ref 150–400)
PMV BLD AUTO: 10.1 FL (ref 8.9–12.9)
POTASSIUM SERPL-SCNC: 3.6 MMOL/L (ref 3.5–5.1)
RBC # BLD AUTO: 3.2 M/UL (ref 3.8–5.2)
RBC MORPH BLD: ABNORMAL
RBC MORPH BLD: ABNORMAL
SODIUM SERPL-SCNC: 140 MMOL/L (ref 136–145)
WBC # BLD AUTO: 17.3 K/UL (ref 3.6–11)

## 2019-05-26 PROCEDURE — 74011250636 HC RX REV CODE- 250/636: Performed by: FAMILY MEDICINE

## 2019-05-26 PROCEDURE — 85025 COMPLETE CBC W/AUTO DIFF WBC: CPT

## 2019-05-26 PROCEDURE — 36415 COLL VENOUS BLD VENIPUNCTURE: CPT

## 2019-05-26 PROCEDURE — 74011250637 HC RX REV CODE- 250/637: Performed by: UROLOGY

## 2019-05-26 PROCEDURE — 74011000258 HC RX REV CODE- 258: Performed by: INTERNAL MEDICINE

## 2019-05-26 PROCEDURE — 65270000029 HC RM PRIVATE

## 2019-05-26 PROCEDURE — 74011250636 HC RX REV CODE- 250/636: Performed by: INTERNAL MEDICINE

## 2019-05-26 PROCEDURE — 74011250637 HC RX REV CODE- 250/637: Performed by: FAMILY MEDICINE

## 2019-05-26 PROCEDURE — 80048 BASIC METABOLIC PNL TOTAL CA: CPT

## 2019-05-26 RX ORDER — OXYCODONE HYDROCHLORIDE 5 MG/1
5 TABLET ORAL
Qty: 12 TAB | Refills: 0 | Status: SHIPPED | OUTPATIENT
Start: 2019-05-26 | End: 2019-05-29

## 2019-05-26 RX ORDER — HYDRALAZINE HYDROCHLORIDE 20 MG/ML
20 INJECTION INTRAMUSCULAR; INTRAVENOUS
Status: DISCONTINUED | OUTPATIENT
Start: 2019-05-26 | End: 2019-05-27 | Stop reason: HOSPADM

## 2019-05-26 RX ORDER — AMOXICILLIN AND CLAVULANATE POTASSIUM 875; 125 MG/1; MG/1
1 TABLET, FILM COATED ORAL 2 TIMES DAILY
Qty: 20 TAB | Refills: 0 | Status: SHIPPED | OUTPATIENT
Start: 2019-05-26 | End: 2019-06-05

## 2019-05-26 RX ORDER — SODIUM CHLORIDE 9 MG/ML
75 INJECTION, SOLUTION INTRAVENOUS CONTINUOUS
Status: DISCONTINUED | OUTPATIENT
Start: 2019-05-26 | End: 2019-05-27

## 2019-05-26 RX ADMIN — HEPARIN SODIUM 5000 UNITS: 5000 INJECTION INTRAVENOUS; SUBCUTANEOUS at 00:37

## 2019-05-26 RX ADMIN — LOSARTAN POTASSIUM 100 MG: 50 TABLET ORAL at 08:33

## 2019-05-26 RX ADMIN — SODIUM CHLORIDE 75 ML/HR: 900 INJECTION, SOLUTION INTRAVENOUS at 18:07

## 2019-05-26 RX ADMIN — PIPERACILLIN SODIUM,TAZOBACTAM SODIUM 3.38 G: 3; .375 INJECTION, POWDER, FOR SOLUTION INTRAVENOUS at 12:17

## 2019-05-26 RX ADMIN — OXYCODONE HYDROCHLORIDE 5 MG: 5 TABLET ORAL at 05:27

## 2019-05-26 RX ADMIN — TAMSULOSIN HYDROCHLORIDE 0.4 MG: 0.4 CAPSULE ORAL at 08:33

## 2019-05-26 RX ADMIN — OXYCODONE HYDROCHLORIDE 5 MG: 5 TABLET ORAL at 11:49

## 2019-05-26 RX ADMIN — PIPERACILLIN SODIUM,TAZOBACTAM SODIUM 3.38 G: 3; .375 INJECTION, POWDER, FOR SOLUTION INTRAVENOUS at 23:23

## 2019-05-26 RX ADMIN — OXYCODONE HYDROCHLORIDE 5 MG: 5 TABLET ORAL at 18:19

## 2019-05-26 RX ADMIN — ROSUVASTATIN CALCIUM 10 MG: 10 TABLET, FILM COATED ORAL at 08:34

## 2019-05-26 RX ADMIN — Medication 10 ML: at 05:23

## 2019-05-26 RX ADMIN — PIPERACILLIN SODIUM,TAZOBACTAM SODIUM 3.38 G: 3; .375 INJECTION, POWDER, FOR SOLUTION INTRAVENOUS at 05:23

## 2019-05-26 NOTE — DISCHARGE INSTRUCTIONS
Ureteral Stent Placement: What to Expect at [address] [facility]    A ureteral (say \"you-REE-ter-ul\") stent is a thin, hollow tube that is placed in the ureter to help urine pass from the kidney into the bladder. Ureters are the tubes that connect the kidneys to the bladder. You may have a small amount of blood in your urine for 1 to 3 days after the procedure. While the stent is in place, you may have to urinate more often, feel a sudden need to urinate, or feel like you cannot completely empty your bladder. You may feel some pain when you urinate or do strenuous activity. You also may notice a small amount of blood in your urine after strenuous activities. These side effects usually do not prevent people from doing their normal daily activities. You may have a thin string coming out of your urethra. Your urethra is the tube that carries urine from your bladder to outside your body. This string is attached to the stent. Try not to pull on the string. The doctor will use the string to pull out the stent when you no longer need it. After the procedure, urine may flow better from your kidneys to your bladder. A ureteral stent may be left in place for several days or for as long as several months. Your doctor will take it out when you no longer need it. This care sheet gives you a general idea about how long it will take for you to recover. But each person recovers at a different pace. Follow the steps below to get better as quickly as possible. How can you care for yourself at home? Activity    · Rest when you feel tired. Getting enough sleep will help you recover.     · Avoid strenuous activities, such as bicycle riding, jogging, weight lifting, or aerobic exercise, until your doctor says it is okay.     · Ask your doctor when you can drive again.     · Most people are able to return to work the day after the procedure.  If your work requires intense activity, you may feel pain in your kidney area or get tired easily. If this happens, you may need to do less strenuous activities while the stent is in.     · Ask your doctor when it is okay for you to have sex. Diet    · You can eat your normal diet. If your stomach is upset, try bland, low-fat foods like plain rice, broiled chicken, toast, and yogurt.     · Drink plenty of fluids (unless your doctor tells you not to). Medicines    · Your doctor will tell you if and when you can restart your medicines. He or she will also give you instructions about taking any new medicines.     · If you take blood thinners, such as warfarin (Coumadin), clopidogrel (Plavix), or aspirin, be sure to talk to your doctor. He or she will tell you if and when to start taking those medicines again. Make sure that you understand exactly what your doctor wants you to do.     · Be safe with medicines. Take pain medicines exactly as directed. ? If the doctor gave you a prescription medicine for pain, take it as prescribed. ? If you are not taking a prescription pain medicine, ask your doctor if you can take an over-the-counter medicine.     · If you think your pain medicine is making you sick to your stomach:  ? Take your medicine after meals (unless your doctor has told you not to). ? Ask your doctor for a different pain medicine.     · If your doctor prescribed antibiotics, take them as directed. Do not stop taking them just because you feel better. You need to take the full course of antibiotics. Follow-up care is a key part of your treatment and safety. Be sure to make and go to all appointments, and call your doctor if you are having problems. It's also a good idea to know your test results and keep a list of the medicines you take. When should you call for help? Call 911 anytime you think you may need emergency care.  For example, call if:    · You passed out (lost consciousness).     · You have severe trouble breathing.     · You have sudden chest pain and shortness of breath, or you cough up blood.     · You have severe belly pain.    Call your doctor now or seek immediate medical care if:    · Part or all of the stent comes out of your urethra.     · You have pain that does not get better after you take pain medicine.     · You have symptoms of a urinary infection. For example:  ? You have blood or pus in your urine. ? You have pain in your back just below your rib cage. This is called flank pain. ? You have a fever, chills, or body aches. ? It hurts to urinate. ? You have groin or belly pain.     · You cannot control when you urinate, or you leak urine.    Watch closely for changes in your health, and be sure to contact your doctor if you have any problems. Where can you learn more? Go to http://josé-catarino.info/. Enter T467 in the search box to learn more about \"Ureteral Stent Placement: What to Expect at Home. \"  Current as of: March 20, 2018  Content Version: 11.9  © 1799-5159 [employer]. Care instructions adapted under license by [employer] (which disclaims liability or warranty for this information). If you have questions about a medical condition or this instruction, always ask your healthcare professional. [place] [address] any warranty or liability for your use of this information.        Please bring this form with you to show your primary care provider at your follow-up appointment.     Primary care provider:  Dr. Jarrell Crane MD    Discharging provider:  Fanta Buenrostro MD    You have been admitted to the hospital with the following diagnoses:  · UTI (urinary tract infection) [N39.0]    FOLLOW-UP CARE RECOMMENDATIONS:    APPOINTMENTS:  · Follow-up with primary care provider, Dr. Jarrell Crane MD  -  Please call None shortly after discharge to set up an appointment to be seen in  1 week   · Urology in 1-2 weeks    FOLLOW-UP TESTS recommended: cbc, bmp in 1 week    PENDING TEST RESULTS:  At the time of your discharge the following test results are still pending:  none  Please make sure you review these results with your outpatient follow-up provider(s). SYMPTOMS to watch for: chest pain, shortness of breath, fever, chills, nausea, vomiting, diarrhea, change in mentation, falling, weakness, bleeding. DIET/what to eat:  Regular cardiac Diet    ACTIVITY:  Activity as tolerated      What to do if new or unexpected symptoms occur? If you experience any of the above symptoms (or should other concerns or questions arise after discharge) please call your primary care physician. Return to the emergency room if you cannot get hold of your doctor. · It is very important that you keep your follow-up appointment(s). · Please bring discharge papers, medication list (and/or medication bottles) to your follow-up appointments for review by your outpatient provider(s). · Please check the list of medications and be sure it includes every medication (even non-prescription medications) that your provider wants you to take. · It is important that you take the medication exactly as they are prescribed. · Keep your medication in the bottles provided by the pharmacist and keep a list of the medication names, dosages, and times to be taken in your wallet. · Do not take other medications without consulting your doctor. · If you have any questions about your medications or other instructions, please talk to your nurse or care provider before you leave the hospital.    I understand that if any problems occur once I am at home I am to contact my physician. These instructions were explained to me and I had the opportunity to ask questions.

## 2019-05-26 NOTE — PROGRESS NOTES
Bedside shift change report given to Halley Yepez (oncoming nurse) by Tala Cho (offgoing nurse). Report included the following information SBAR.

## 2019-05-26 NOTE — PROGRESS NOTES
Saray Haq 56  [address] [facility] [address]  [phone]    Work/School Note    Date: 5/23/2019    To Whom It May concern:    Dahiana Jean was seen and treated today in the hospital from 5/23/2019 to 5/26/2019. Dahiana Jean may return to work on 06/23/2019.     Sincerely,    Leeanne Simpson MD

## 2019-05-26 NOTE — PROGRESS NOTES
Doing well. Afebrile  Ucx pansensitive E.coli  Blood cxs negative to date. May f/u as outpatient for definitve treatment of stone.

## 2019-05-26 NOTE — DISCHARGE SUMMARY
Discharge Summary     Patient:  Rissa Kuhn       MRN: [id number]       YOB: 1960       Age: 63 y.o. Date of admission:  5/23/2019    Date of discharge:  5/26/2019    Primary care provider: Dr. Caden Basilio MD    Admitting provider:  Kwan Harmon MD    Discharging provider:  Kristene Goodpasture, Agip Brian [address].: [phone]. If unavailable, call [phone] and ask the  to page the triage hospitalist.    Consultations  · IP CONSULT TO UROLOGY  · IP CONSULT TO GENERAL SURGERY    Procedures  · Procedure(s):  · CYSTOSCOPY URETERAL LEFT [facility]    Discharge destination: home. The patient is stable for discharge. Admission diagnosis  · UTI (urinary tract infection) [N39.0]    Current Discharge Medication List      START taking these medications    Details   oxyCODONE IR (ROXICODONE) 5 mg immediate release tablet Take 1 Tab by mouth every six (6) hours as needed for Pain for up to 3 days. Max Daily Amount: 20 mg.  Qty: 12 Tab, Refills: 0    Associated Diagnoses: Kidney stone      amoxicillin-clavulanate (AUGMENTIN) 875-125 mg per tablet Take 1 Tab by mouth two (2) times a day for 10 days. Qty: 20 Tab, Refills: 0         CONTINUE these medications which have NOT CHANGED    Details   losartan (COZAAR) 100 mg tablet Take 100 mg by mouth daily. Indications: high blood pressure      rosuvastatin (CRESTOR) 10 mg tablet Take 10 mg by mouth daily. ondansetron (ZOFRAN ODT) 4 mg disintegrating tablet Take 1 Tab by mouth every eight (8) hours as needed for Nausea. Qty: 12 Tab, Refills: 0      tamsulosin (FLOMAX) 0.4 mg capsule Take 1 Cap by mouth daily for 7 days.   Qty: 7 Cap, Refills: 0         STOP taking these medications       HYDROcodone-acetaminophen (VICODIN) 5-300 mg tablet Comments:   Reason for Stopping:         naproxen (NAPROSYN) 500 mg tablet Comments:   Reason for Stopping: cephALEXin (KEFLEX) 500 mg capsule Comments:   Reason for Stopping: Follow-up Information     Follow up With Specialties Details Why Contact Info    Susana White MD Internal Medicine In 1 week  [address] [place] [address]  Kyrie Holt for [facility] [place] [address] [phone]      Eugene Wilkinson MD Urology In 2 weeks  [place] [address]  [place]   [phone]            Final discharge diagnoses and brief hospital course  The patient is a 26-year-old female with past medical history of hypertension, hyperlipidemia, and arthritis, who presents to the hospital with the left flank pain.   The patient reports this morning she woke up, had a fever of 102.7 degrees Fahrenheit, continued to have left flank pain, and tried taking her hydrocodone but that did not seem to relieve pain.  She also had some increased nausea, lethargy, weakness, got concerned and decided to come to the hospital.  In the hospital, the patient was found to have worsening FIOR associated with tachycardia and possible early SIRS/sepsis, and the patient was requested to be admitted under the hospitalist service. . 5/23/2019      Nephrolithiasis with hydronephrosis s/p stent placement on 5/23  - urology on board  -  IV hydration  - urine cx: E coli  - Blood cx: NGTD  - on flomax  - voiding ok  - abx changed to IV zosyn   - dced on po augmentin for 10 days    Leucocytosis - still present 17.3  - possible due to PNA vs ureteral stent vs stress induced  - CT abd: Interval decompression of left hydronephrosis with ureteral stent in place. New left lower lobe subsegmental atelectasis versus pneumonia. Gallbladder wall thickening versus pericholecystic fluid. Consider cholecystitis  - pt clinically looks better, no symptoms     Cholecystitis ruled out:  - no ruq abd pain, tolerating diet  - RUQ abd us: No evidence of cholecystitis. No evidence of acute process.   - appreciate general surgery input     PNA   - normal lactic acid  - pro calcitonin elevated  - abx as above  - Incentive spirometry     FIOR - resolved   - will monitor     Hypertension-  c/w losartan     Alcohol abuse   - she drinks 1-2 glasses of wine/ cocktails daily  - CIWA protocol, Ativan p.r.n.    - counseled to quit    Recommendations:  pcp f/u in 1 week  Urology f/u in 1-2 weeks      Physical examination at discharge  Visit Vitals  /84 (BP 1 Location: Right arm, BP Patient Position: At rest)   Pulse 90   Temp 98.6 °F (37 °C)   Resp 18   Wt 68 kg (149 lb 14.6 oz)   SpO2 93%   BMI 24.95 kg/m²     Gen: NAD  HEENT: anicteric sclerae, normal conjunctiva, oropharynx clear, MM moist  Neck: supple, trachea midline, no adenopathy  Heart: RRR, no MRG, no JVD, no peripheral edema  Lungs: CTA b/l, non-labored respirations  Abd: soft, no RUQ tenderness. BS+  Extr: warm  Skin: dry, no rash  Neuro: CN II-XII grossly intact, normal speech, moves all extremities  Psych: normal mood, appropriate affect           Pertinent imaging studies:    CT abd 5/22: 1. Obstructing mid left ureteral calculus (4 x 6 mm). Moderate hydronephrosis. 2. Multiple, small, nonobstructing, bilateral renal calculi. 3. Mild hepatic steatosis. Recent Labs     05/26/19  0413 05/25/19  0432 05/24/19  0447   WBC 17.3* 17.9* 13.0*   HGB 9.6* 9.7* 10.5*   HCT 29.7* 30.3* 33.4*    220 187     Recent Labs     05/26/19  0413 05/25/19  0432 05/24/19  0447    142 139   K 3.6 3.7 4.4   * 114* 112*   CO2 20* 21 22   BUN 13 30* 28*   CREA 0.93 1.16* 1.23*   GLU 98 108* 162*   CA 8.6 8.5 8.8     No results for input(s): SGOT, GPT, AP, TBIL, TP, ALB, GLOB, GGT, AML, LPSE in the last 72 hours. No lab exists for component: AMYP, HLPSE  No results for input(s): INR, PTP, APTT in the last 72 hours. No lab exists for component: INREXT   No results for input(s): FE, TIBC, PSAT, FERR in the last 72 hours. No results for input(s): PH, PCO2, PO2 in the last 72 hours.   No results for input(s): CPK, CKMB in the last 72 hours. No lab exists for component: TROPONINI  No components found for: [place]    Chronic Diagnoses:    Problem List as of 5/26/2019 Date Reviewed: 5/23/2019          Codes Class Noted - Resolved    UTI (urinary tract infection) ICD-10-CM: N39.0  ICD-9-CM: 599.0  5/23/2019 - Present        Renal colic TKH-38-DG: I84  SATYA-9-OY: 788.0  5/22/2019 - Present              Time spent on discharge related activities today greater than 30 minutes.       Signed:  iTarra Ayala MD                 Hospitalist, Internal Medicine      Cc: More Michael MD

## 2019-05-27 VITALS
HEART RATE: 80 BPM | RESPIRATION RATE: 18 BRPM | OXYGEN SATURATION: 98 % | WEIGHT: 149.91 LBS | DIASTOLIC BLOOD PRESSURE: 91 MMHG | SYSTOLIC BLOOD PRESSURE: 165 MMHG | TEMPERATURE: 99 F | BODY MASS INDEX: 24.95 KG/M2

## 2019-05-27 LAB
ANION GAP SERPL CALC-SCNC: 10 MMOL/L (ref 5–15)
BASOPHILS # BLD: 0 K/UL (ref 0–0.1)
BASOPHILS NFR BLD: 0 % (ref 0–1)
BUN SERPL-MCNC: 8 MG/DL (ref 6–20)
BUN/CREAT SERPL: 8 (ref 12–20)
CALCIUM SERPL-MCNC: 8.7 MG/DL (ref 8.5–10.1)
CHLORIDE SERPL-SCNC: 109 MMOL/L (ref 97–108)
CO2 SERPL-SCNC: 23 MMOL/L (ref 21–32)
CREAT SERPL-MCNC: 1.01 MG/DL (ref 0.55–1.02)
DIFFERENTIAL METHOD BLD: ABNORMAL
EOSINOPHIL # BLD: 0.5 K/UL (ref 0–0.4)
EOSINOPHIL NFR BLD: 3 % (ref 0–7)
ERYTHROCYTE [DISTWIDTH] IN BLOOD BY AUTOMATED COUNT: 12.9 % (ref 11.5–14.5)
GLUCOSE SERPL-MCNC: 96 MG/DL (ref 65–100)
HCT VFR BLD AUTO: 33.5 % (ref 35–47)
HGB BLD-MCNC: 10.7 G/DL (ref 11.5–16)
IMM GRANULOCYTES # BLD AUTO: 0 K/UL
IMM GRANULOCYTES NFR BLD AUTO: 0 %
LYMPHOCYTES # BLD: 2.4 K/UL (ref 0.8–3.5)
LYMPHOCYTES NFR BLD: 15 % (ref 12–49)
MCH RBC QN AUTO: 29.6 PG (ref 26–34)
MCHC RBC AUTO-ENTMCNC: 31.9 G/DL (ref 30–36.5)
MCV RBC AUTO: 92.8 FL (ref 80–99)
METAMYELOCYTES NFR BLD MANUAL: 1 %
MONOCYTES # BLD: 1 K/UL (ref 0–1)
MONOCYTES NFR BLD: 6 % (ref 5–13)
MYELOCYTES NFR BLD MANUAL: 5 %
NEUTS BAND NFR BLD MANUAL: 2 % (ref 0–6)
NEUTS SEG # BLD: 11.2 K/UL (ref 1.8–8)
NEUTS SEG NFR BLD: 68 % (ref 32–75)
NRBC # BLD: 0.02 K/UL (ref 0–0.01)
NRBC BLD-RTO: 0.1 PER 100 WBC
PLATELET # BLD AUTO: 301 K/UL (ref 150–400)
PLATELET COMMENTS,PCOM: ABNORMAL
PMV BLD AUTO: 10 FL (ref 8.9–12.9)
POTASSIUM SERPL-SCNC: 3.3 MMOL/L (ref 3.5–5.1)
RBC # BLD AUTO: 3.61 M/UL (ref 3.8–5.2)
RBC MORPH BLD: ABNORMAL
SODIUM SERPL-SCNC: 142 MMOL/L (ref 136–145)
WBC # BLD AUTO: 16 K/UL (ref 3.6–11)

## 2019-05-27 PROCEDURE — 36415 COLL VENOUS BLD VENIPUNCTURE: CPT

## 2019-05-27 PROCEDURE — 74011250637 HC RX REV CODE- 250/637: Performed by: INTERNAL MEDICINE

## 2019-05-27 PROCEDURE — 74011250637 HC RX REV CODE- 250/637: Performed by: FAMILY MEDICINE

## 2019-05-27 PROCEDURE — 85025 COMPLETE CBC W/AUTO DIFF WBC: CPT

## 2019-05-27 PROCEDURE — 74011250637 HC RX REV CODE- 250/637: Performed by: UROLOGY

## 2019-05-27 PROCEDURE — 74011250636 HC RX REV CODE- 250/636: Performed by: INTERNAL MEDICINE

## 2019-05-27 PROCEDURE — 80048 BASIC METABOLIC PNL TOTAL CA: CPT

## 2019-05-27 PROCEDURE — 74011000258 HC RX REV CODE- 258: Performed by: INTERNAL MEDICINE

## 2019-05-27 RX ORDER — AMLODIPINE BESYLATE 5 MG/1
5 TABLET ORAL DAILY
Status: DISCONTINUED | OUTPATIENT
Start: 2019-05-27 | End: 2019-05-27 | Stop reason: HOSPADM

## 2019-05-27 RX ORDER — POTASSIUM CHLORIDE 750 MG/1
40 TABLET, FILM COATED, EXTENDED RELEASE ORAL
Status: COMPLETED | OUTPATIENT
Start: 2019-05-27 | End: 2019-05-27

## 2019-05-27 RX ORDER — AMLODIPINE BESYLATE 5 MG/1
5 TABLET ORAL DAILY
Qty: 30 TAB | Refills: 0 | Status: SHIPPED | OUTPATIENT
Start: 2019-05-28

## 2019-05-27 RX ADMIN — LOSARTAN POTASSIUM 100 MG: 50 TABLET ORAL at 09:20

## 2019-05-27 RX ADMIN — TAMSULOSIN HYDROCHLORIDE 0.4 MG: 0.4 CAPSULE ORAL at 09:21

## 2019-05-27 RX ADMIN — AMLODIPINE BESYLATE 5 MG: 5 TABLET ORAL at 09:21

## 2019-05-27 RX ADMIN — OXYCODONE HYDROCHLORIDE 5 MG: 5 TABLET ORAL at 01:39

## 2019-05-27 RX ADMIN — ACETAMINOPHEN 650 MG: 325 TABLET ORAL at 14:06

## 2019-05-27 RX ADMIN — POTASSIUM CHLORIDE 40 MEQ: 750 TABLET, EXTENDED RELEASE ORAL at 14:05

## 2019-05-27 RX ADMIN — ACETAMINOPHEN 650 MG: 325 TABLET ORAL at 01:42

## 2019-05-27 RX ADMIN — OXYCODONE HYDROCHLORIDE 5 MG: 5 TABLET ORAL at 14:06

## 2019-05-27 RX ADMIN — PIPERACILLIN SODIUM,TAZOBACTAM SODIUM 3.38 G: 3; .375 INJECTION, POWDER, FOR SOLUTION INTRAVENOUS at 05:54

## 2019-05-27 RX ADMIN — Medication 10 ML: at 14:00

## 2019-05-27 RX ADMIN — ROSUVASTATIN CALCIUM 10 MG: 10 TABLET, FILM COATED ORAL at 09:21

## 2019-05-27 NOTE — PROGRESS NOTES
Bedside shift change report given to ISAURA Serrano (oncoming nurse) by ISAURA Sidhu (offgoing nurse). Report included the following information SBAR.

## 2019-05-27 NOTE — DISCHARGE SUMMARY
Discharge Summary     Patient:  Maris Yin       MRN: [id number]       YOB: 1960       Age: 63 y.o. Date of admission:  5/23/2019    Date of discharge:  5/27/2019    Primary care provider: Dr. Jarrell Crane MD    Admitting provider:  Rupert Willingham MD    Discharging provider:  Dilip Healy [address].: [phone]. If unavailable, call [phone] and ask the  to page the triage hospitalist.    Consultations  IP CONSULT TO UROLOGY  IP CONSULT TO GENERAL SURGERY    Procedures  Procedure(s):  [address] [facility]    Discharge destination: home. The patient is stable for discharge. Admission diagnosis  UTI (urinary tract infection) [N39.0]    Current Discharge Medication List      START taking these medications    Details   amLODIPine (NORVASC) 5 mg tablet Take 1 Tab by mouth daily. Qty: 30 Tab, Refills: 0      oxyCODONE IR (ROXICODONE) 5 mg immediate release tablet Take 1 Tab by mouth every six (6) hours as needed for Pain for up to 3 days. Max Daily Amount: 20 mg.  Qty: 12 Tab, Refills: 0    Associated Diagnoses: Kidney stone      amoxicillin-clavulanate (AUGMENTIN) 875-125 mg per tablet Take 1 Tab by mouth two (2) times a day for 10 days. Qty: 20 Tab, Refills: 0         CONTINUE these medications which have NOT CHANGED    Details   losartan (COZAAR) 100 mg tablet Take 100 mg by mouth daily. Indications: high blood pressure      rosuvastatin (CRESTOR) 10 mg tablet Take 10 mg by mouth daily. ondansetron (ZOFRAN ODT) 4 mg disintegrating tablet Take 1 Tab by mouth every eight (8) hours as needed for Nausea. Qty: 12 Tab, Refills: 0      tamsulosin (FLOMAX) 0.4 mg capsule Take 1 Cap by mouth daily for 7 days.   Qty: 7 Cap, Refills: 0         STOP taking these medications       HYDROcodone-acetaminophen (VICODIN) 5-300 mg tablet Comments:   Reason for Stopping: naproxen (NAPROSYN) 500 mg tablet Comments:   Reason for Stopping:         cephALEXin (KEFLEX) 500 mg capsule Comments:   Reason for Stopping: Follow-up Information     Follow up With Specialties Details Why Contact Info    Ashley Christie MD Internal Medicine In 1 week  [address] [facility]  Kyrie Shaikhers for [employer] [facility] [place] [address]  [phone]      Sonia Muniz MD Urology In 2 weeks  [place] [address]  [facility] [address]  [phone]            Final discharge diagnoses and brief hospital course  The patient is a 60-year-old female with past medical history of hypertension, hyperlipidemia, and arthritis, who presents to the hospital with the left flank pain.   The patient reports this morning she woke up, had a fever of 102.7 degrees Fahrenheit, continued to have left flank pain, and tried taking her hydrocodone but that did not seem to relieve pain.  She also had some increased nausea, lethargy, weakness, got concerned and decided to come to the hospital.  In the hospital, the patient was found to have worsening FIOR associated with tachycardia and possible early SIRS/sepsis, and the patient was requested to be admitted under the hospitalist service. . 5/23/2019      Nephrolithiasis with hydronephrosis s/p stent placement on 5/23  - urology on board  - urine cx: E coli  - Blood cx: NGTD  - on flomax  - voiding ok  - abx changed to IV zosyn   - dced on po augmentin for 10 days    Leucocytosis - trending down  - possible due to PNA vs ureteral stent vs stress induced  - CT abd: Interval decompression of left hydronephrosis with ureteral stent in place. New left lower lobe subsegmental atelectasis versus pneumonia. Gallbladder wall thickening versus pericholecystic fluid. Consider cholecystitis  - pt clinically looks better, no symptoms     Cholecystitis ruled out:  - no ruq abd pain, tolerating diet  - RUQ abd us: No evidence of cholecystitis.  No evidence of acute process. - appreciate general surgery input     PNA   - normal lactic acid  - pro calcitonin elevated  - abx as above  - Incentive spirometry     FIOR - resolved   - will monitor     Hypertension-  c/w losartan. bp elevated. Added amlodipine     Alcohol abuse   - she drinks 1-2 glasses of wine/ cocktails daily  - CIWA protocol, Ativan p.r.n.    - counseled to quit    Recommendations:  pcp f/u in 1 week  Urology f/u in 1-2 weeks  BP f/u with PCP      Physical examination at discharge  Visit Vitals  /80   Pulse 80   Temp 99.2 °F (37.3 °C)   Resp 18   Wt 68 kg (149 lb 14.6 oz)   SpO2 98%   BMI 24.95 kg/m²     Gen: NAD  HEENT: anicteric sclerae, normal conjunctiva, oropharynx clear, MM moist  Neck: supple, trachea midline, no adenopathy  Heart: RRR, no MRG, no JVD, no peripheral edema  Lungs: CTA b/l, non-labored respirations  Abd: soft, no RUQ tenderness. BS+  Extr: warm  Skin: dry, no rash  Neuro: CN II-XII grossly intact, normal speech, moves all extremities  Psych: normal mood, appropriate affect           Pertinent imaging studies:    CT abd 5/22: 1. Obstructing mid left ureteral calculus (4 x 6 mm). Moderate hydronephrosis. 2. Multiple, small, nonobstructing, bilateral renal calculi. 3. Mild hepatic steatosis. Recent Labs     05/27/19  1118 05/26/19  0413 05/25/19  0432   WBC 16.0* 17.3* 17.9*   HGB 10.7* 9.6* 9.7*   HCT 33.5* 29.7* 30.3*    238 220     Recent Labs     05/27/19  1118 05/26/19  0413 05/25/19  0432    140 142   K 3.3* 3.6 3.7   * 112* 114*   CO2 23 20* 21   BUN 8 13 30*   CREA 1.01 0.93 1.16*   GLU 96 98 108*   CA 8.7 8.6 8.5     No results for input(s): SGOT, GPT, AP, TBIL, TP, ALB, GLOB, GGT, AML, LPSE in the last 72 hours. No lab exists for component: AMYP, HLPSE  No results for input(s): INR, PTP, APTT in the last 72 hours. No lab exists for component: INREXT, INREXT   No results for input(s): FE, TIBC, PSAT, FERR in the last 72 hours.    No results for input(s): PH, PCO2, PO2 in the last 72 hours. No results for input(s): CPK, CKMB in the last 72 hours. No lab exists for component: TROPONINI  No components found for: [place]    Chronic Diagnoses:    Problem List as of 5/27/2019 Date Reviewed: 5/23/2019          Codes Class Noted - Resolved    UTI (urinary tract infection) ICD-10-CM: N39.0  ICD-9-CM: 599.0  5/23/2019 - Present        Renal colic TWI-31-WF: L94  RUTHANN-1-UU: 788.0  5/22/2019 - Present              Time spent on discharge related activities today greater than 30 minutes.       Signed:  Michelle Mendez MD                 Hospitalist, Internal Medicine      Cc: Ariella Schmidt MD

## 2019-05-27 NOTE — PROGRESS NOTES
Bedside shift change report given to [facility] ISAURA HARTMAN (oncoming nurse) by Shanelle Snyder RN (offgoing nurse). Report included the following information SBAR, Kardex, Intake/Output and MAR.

## 2019-05-28 LAB
BACTERIA SPEC CULT: NORMAL
SERVICE CMNT-IMP: NORMAL

## 2020-07-24 ENCOUNTER — HOSPITAL ENCOUNTER (OUTPATIENT)
Dept: MAMMOGRAPHY | Age: 60
Discharge: HOME | End: 2020-07-24
Attending: INTERNAL MEDICINE
Payer: OTHER GOVERNMENT

## 2020-07-24 DIAGNOSIS — Z12.31 VISIT FOR SCREENING MAMMOGRAM: ICD-10-CM

## 2020-07-24 PROCEDURE — 77063 BREAST TOMOSYNTHESIS BI: CPT

## 2020-11-09 ENCOUNTER — TRANSCRIBE ORDER (OUTPATIENT)
Dept: REGISTRATION | Age: 60
End: 2020-11-09

## 2020-11-09 ENCOUNTER — HOSPITAL ENCOUNTER (OUTPATIENT)
Dept: GENERAL RADIOLOGY | Age: 60
Discharge: HOME | End: 2020-11-09
Payer: OTHER GOVERNMENT

## 2020-11-09 DIAGNOSIS — M79.662 PAIN OF LEFT LOWER LEG: ICD-10-CM

## 2020-11-09 DIAGNOSIS — R52 PAIN: Primary | ICD-10-CM

## 2020-11-09 DIAGNOSIS — R52 PAIN: ICD-10-CM

## 2020-11-09 DIAGNOSIS — M79.662 PAIN OF LEFT LOWER LEG: Primary | ICD-10-CM

## 2020-11-09 PROCEDURE — 73590 X-RAY EXAM OF LOWER LEG: CPT

## 2021-06-16 ENCOUNTER — TRANSCRIBE ORDER (OUTPATIENT)
Dept: SCHEDULING | Age: 61
End: 2021-06-16

## 2021-06-16 DIAGNOSIS — Z00.00 ENCOUNTER FOR GENERAL ADULT MEDICAL EXAMINATION WITHOUT ABNORMAL FINDINGS: Primary | ICD-10-CM

## 2021-06-16 DIAGNOSIS — Z12.31 VISIT FOR SCREENING MAMMOGRAM: Primary | ICD-10-CM

## 2021-06-16 DIAGNOSIS — Z78.0 POSTMENOPAUSAL: ICD-10-CM

## 2021-06-16 DIAGNOSIS — Z13.820 SCREENING FOR OSTEOPOROSIS: ICD-10-CM

## 2021-07-28 ENCOUNTER — HOSPITAL ENCOUNTER (OUTPATIENT)
Dept: MAMMOGRAPHY | Age: 61
Discharge: HOME | End: 2021-07-28
Attending: INTERNAL MEDICINE
Payer: OTHER GOVERNMENT

## 2021-07-28 DIAGNOSIS — Z00.00 ENCOUNTER FOR GENERAL ADULT MEDICAL EXAMINATION WITHOUT ABNORMAL FINDINGS: ICD-10-CM

## 2021-07-28 DIAGNOSIS — Z78.0 POSTMENOPAUSAL: ICD-10-CM

## 2021-07-28 DIAGNOSIS — Z13.820 SCREENING FOR OSTEOPOROSIS: ICD-10-CM

## 2021-07-28 DIAGNOSIS — Z12.31 VISIT FOR SCREENING MAMMOGRAM: ICD-10-CM

## 2021-07-28 PROCEDURE — 77080 DXA BONE DENSITY AXIAL: CPT

## 2021-07-28 PROCEDURE — 77063 BREAST TOMOSYNTHESIS BI: CPT

## 2022-03-18 PROBLEM — N23 RENAL COLIC: Status: ACTIVE | Noted: 2019-05-22

## 2022-03-19 PROBLEM — N39.0 UTI (URINARY TRACT INFECTION): Status: ACTIVE | Noted: 2019-05-23

## 2022-07-07 ENCOUNTER — TRANSCRIBE ORDER (OUTPATIENT)
Dept: SCHEDULING | Age: 62
End: 2022-07-07

## 2022-07-07 DIAGNOSIS — Z12.31 SCREENING MAMMOGRAM FOR HIGH-RISK PATIENT: Primary | ICD-10-CM

## 2022-08-26 ENCOUNTER — HOSPITAL ENCOUNTER (OUTPATIENT)
Dept: MAMMOGRAPHY | Age: 62
Discharge: HOME | End: 2022-08-26
Attending: INTERNAL MEDICINE
Payer: OTHER GOVERNMENT

## 2022-08-26 DIAGNOSIS — Z12.31 SCREENING MAMMOGRAM FOR HIGH-RISK PATIENT: ICD-10-CM

## 2022-08-26 PROCEDURE — 77063 BREAST TOMOSYNTHESIS BI: CPT

## 2023-10-09 ENCOUNTER — HOSPITAL ENCOUNTER (OUTPATIENT)
Facility: HOSPITAL | Age: 63
Discharge: HOME | End: 2023-10-12
Payer: OTHER GOVERNMENT

## 2023-10-09 VITALS — BODY MASS INDEX: 24.16 KG/M2 | HEIGHT: 65 IN | WEIGHT: 145 LBS

## 2023-10-09 DIAGNOSIS — Z12.31 BREAST CANCER SCREENING BY MAMMOGRAM: ICD-10-CM

## 2023-10-09 PROCEDURE — 77063 BREAST TOMOSYNTHESIS BI: CPT

## 2024-11-19 ENCOUNTER — TRANSCRIBE ORDERS (OUTPATIENT)
Dept: ADMINISTRATIVE | Age: 64
End: 2024-11-19

## 2024-11-19 DIAGNOSIS — Z12.31 ENCOUNTER FOR MAMMOGRAM TO ESTABLISH BASELINE MAMMOGRAM: Primary | ICD-10-CM

## 2024-12-27 ENCOUNTER — HOSPITAL ENCOUNTER (OUTPATIENT)
Facility: HOSPITAL | Age: 64
Discharge: HOME | End: 2024-12-30
Payer: OTHER GOVERNMENT

## 2024-12-27 VITALS — WEIGHT: 148 LBS | HEIGHT: 65 IN | BODY MASS INDEX: 24.66 KG/M2

## 2024-12-27 DIAGNOSIS — Z12.31 ENCOUNTER FOR MAMMOGRAM TO ESTABLISH BASELINE MAMMOGRAM: ICD-10-CM

## 2024-12-27 PROCEDURE — 77067 SCR MAMMO BI INCL CAD: CPT

## 2025-02-25 SDOH — HEALTH STABILITY: PHYSICAL HEALTH: ON AVERAGE, HOW MANY MINUTES DO YOU ENGAGE IN EXERCISE AT THIS LEVEL?: 60 MIN

## 2025-02-25 SDOH — HEALTH STABILITY: PHYSICAL HEALTH: ON AVERAGE, HOW MANY DAYS PER WEEK DO YOU ENGAGE IN MODERATE TO STRENUOUS EXERCISE (LIKE A BRISK WALK)?: 3 DAYS

## 2025-02-26 ENCOUNTER — OFFICE VISIT (OUTPATIENT)
Age: 65
End: 2025-02-26
Payer: OTHER GOVERNMENT

## 2025-02-26 VITALS
HEIGHT: 64 IN | SYSTOLIC BLOOD PRESSURE: 123 MMHG | HEART RATE: 96 BPM | WEIGHT: 149 LBS | RESPIRATION RATE: 16 BRPM | BODY MASS INDEX: 25.44 KG/M2 | TEMPERATURE: 98 F | DIASTOLIC BLOOD PRESSURE: 82 MMHG | OXYGEN SATURATION: 98 %

## 2025-02-26 DIAGNOSIS — I10 PRIMARY HYPERTENSION: ICD-10-CM

## 2025-02-26 DIAGNOSIS — Z13.1 DIABETES MELLITUS SCREENING: ICD-10-CM

## 2025-02-26 DIAGNOSIS — E78.5 HYPERLIPIDEMIA, UNSPECIFIED HYPERLIPIDEMIA TYPE: ICD-10-CM

## 2025-02-26 DIAGNOSIS — Z23 ENCOUNTER FOR IMMUNIZATION: ICD-10-CM

## 2025-02-26 DIAGNOSIS — F51.01 PRIMARY INSOMNIA: Primary | ICD-10-CM

## 2025-02-26 PROCEDURE — 90471 IMMUNIZATION ADMIN: CPT | Performed by: STUDENT IN AN ORGANIZED HEALTH CARE EDUCATION/TRAINING PROGRAM

## 2025-02-26 PROCEDURE — 99204 OFFICE O/P NEW MOD 45 MIN: CPT | Performed by: STUDENT IN AN ORGANIZED HEALTH CARE EDUCATION/TRAINING PROGRAM

## 2025-02-26 PROCEDURE — 90715 TDAP VACCINE 7 YRS/> IM: CPT | Performed by: STUDENT IN AN ORGANIZED HEALTH CARE EDUCATION/TRAINING PROGRAM

## 2025-02-26 PROCEDURE — 3074F SYST BP LT 130 MM HG: CPT | Performed by: STUDENT IN AN ORGANIZED HEALTH CARE EDUCATION/TRAINING PROGRAM

## 2025-02-26 PROCEDURE — 3079F DIAST BP 80-89 MM HG: CPT | Performed by: STUDENT IN AN ORGANIZED HEALTH CARE EDUCATION/TRAINING PROGRAM

## 2025-02-26 RX ORDER — ALPRAZOLAM 0.25 MG
0.25 TABLET ORAL AS NEEDED
COMMUNITY
End: 2025-02-26 | Stop reason: SDUPTHER

## 2025-02-26 RX ORDER — PEDIATRIC MULTIVIT 61/D3/VIT K 1500-800
2 CAPSULE ORAL DAILY
COMMUNITY
Start: 2024-01-01

## 2025-02-26 RX ORDER — LOSARTAN POTASSIUM 50 MG/1
50 TABLET ORAL DAILY
COMMUNITY

## 2025-02-26 RX ORDER — ROSUVASTATIN CALCIUM 40 MG/1
40 TABLET, COATED ORAL EVERY EVENING
COMMUNITY

## 2025-02-26 RX ORDER — ALPRAZOLAM 0.25 MG
0.25 TABLET ORAL AS NEEDED
Qty: 20 TABLET | Refills: 0 | Status: SHIPPED | OUTPATIENT
Start: 2025-02-26 | End: 2025-03-28

## 2025-02-26 SDOH — ECONOMIC STABILITY: FOOD INSECURITY: WITHIN THE PAST 12 MONTHS, THE FOOD YOU BOUGHT JUST DIDN'T LAST AND YOU DIDN'T HAVE MONEY TO GET MORE.: NEVER TRUE

## 2025-02-26 SDOH — ECONOMIC STABILITY: FOOD INSECURITY: WITHIN THE PAST 12 MONTHS, YOU WORRIED THAT YOUR FOOD WOULD RUN OUT BEFORE YOU GOT MONEY TO BUY MORE.: NEVER TRUE

## 2025-02-26 ASSESSMENT — ENCOUNTER SYMPTOMS
COUGH: 0
CONSTIPATION: 0
ABDOMINAL PAIN: 0
NAUSEA: 0
BLOOD IN STOOL: 0
VOMITING: 0
SHORTNESS OF BREATH: 0
DIARRHEA: 0

## 2025-02-26 ASSESSMENT — PATIENT HEALTH QUESTIONNAIRE - PHQ9
2. FEELING DOWN, DEPRESSED OR HOPELESS: NOT AT ALL
SUM OF ALL RESPONSES TO PHQ QUESTIONS 1-9: 0
1. LITTLE INTEREST OR PLEASURE IN DOING THINGS: NOT AT ALL
SUM OF ALL RESPONSES TO PHQ QUESTIONS 1-9: 0
SUM OF ALL RESPONSES TO PHQ9 QUESTIONS 1 & 2: 0

## 2025-02-26 NOTE — PROGRESS NOTES
Verified name and birth date for privacy precautions.   Chart reviewed in preparation for today's visit.     Chief Complaint   Patient presents with    New Patient          Health Maintenance Due   Topic    Lipids     Depression Screen     HIV screen     Hepatitis C screen     DTaP/Tdap/Td vaccine (1 - Tdap)    Cervical cancer screen     Diabetes screen     Colorectal Cancer Screen     Pneumococcal 50+ years Vaccine (1 of 1 - PCV)    COVID-19 Vaccine (5 - 2024-25 season)         Wt Readings from Last 3 Encounters:   02/26/25 67.6 kg (149 lb)   12/27/24 67.1 kg (148 lb)   10/09/23 65.8 kg (145 lb)     Temp Readings from Last 3 Encounters:   02/26/25 98 °F (36.7 °C)     BP Readings from Last 3 Encounters:   02/26/25 123/82   10/30/18 (!) 140/72     Pulse Readings from Last 3 Encounters:   02/26/25 96   10/30/18 60       Social Determinants of Health     Tobacco Use: Low Risk  (2/26/2025)    Patient History     Smoking Tobacco Use: Never     Smokeless Tobacco Use: Never     Passive Exposure: Not on file   Alcohol Use: Not At Risk (10/30/2018)    Received from [facility]  (prior to 6/17/2023)    AUDIT-C     Frequency of Alcohol Consumption: Never     Average Number of Drinks: Not on file     Frequency of Binge Drinking: Not on file   Financial Resource Strain: Not on file   Food Insecurity: No Food Insecurity (2/26/2025)    Hunger Vital Sign     Worried About Running Out of Food in the Last Year: Never true     Ran Out of Food in the Last Year: Never true   Transportation Needs: No Transportation Needs (2/26/2025)    PRAPARE - Transportation     Lack of Transportation (Medical): No     Lack of Transportation (Non-Medical): No   Physical Activity: Sufficiently Active (2/25/2025)    Exercise Vital Sign     Days of Exercise per Week: 3 days     Minutes of Exercise per Session: 60 min   Stress: Not on file   Social Connections: Not on file   Intimate Partner Violence: Not on file   Depression: Not at risk

## 2025-02-26 NOTE — PROGRESS NOTES
Zahraa Maciel is a 64 y.o. year old female who is a new patient to me today. She was previously followed by Dr Ward.      Assessment & Plan:     Assessment & Plan  1. Hypertension:   - Chronic, BP at goal  - Continue losartan 50 mg daily    2. Hypercholesterolemia.  - Continue statin  - Update labs today    3. Insomnia.  - Uses 0.25mg of alprazolam as needed for sleep, reports using maybe once or twice a month  - PDMP reviewed today and refill provided    4. Rosacea.  - Advised to follow up with dermatology    5. Health maintenance.  - Administer tetanus booster today  - Signed record release form for Dr. Ward's office  - Update routine labs today    6. Encounter to Establish Care    Follow-up  - RTC in 6 months for CPE/BP followup      History/Subjective     History of Present Illness  The patient presents to establish care.    Hypertension and Hypercholesterolemia  She has a history of hypertension and hypercholesterolemia. Her antihypertensive medication is ready for refill. Home blood pressure readings are typically in the 120s/80s. No refill needed for cholesterol medication. Last consultation with previous PCP was a year ago. Currently on losartan 50 mg daily.    Dry Eyes  She has dry eyes and uses Xiidra.    Insomnia  She has insomnia, takes alprazolam as needed for sleep, about once a month, and seeks a refill.    Rosacea  She has hx of rosacea, follows with dermatology      SOCIAL HISTORY  - Retired from the [employer]  - Enjoys traveling and home projects    FAMILY HISTORY  - Mother had diabetes, hypertension, hypercholesterolemia, and  of cirrhosis  - Father had hypercholesterolemia, heart problems, and  of a massive heart attack  - Sister had pancreatic cancer    Review of Systems  Review of Systems   Constitutional:  Negative for chills and fever.   Respiratory:  Negative for cough and shortness of breath.    Cardiovascular:  Negative for chest pain and palpitations.

## 2025-02-27 LAB
ALBUMIN SERPL-MCNC: 4.1 G/DL (ref 3.5–5)
ALBUMIN/GLOB SERPL: 1.3 (ref 1.1–2.2)
ALP SERPL-CCNC: 76 U/L (ref 45–117)
ALT SERPL-CCNC: 27 U/L (ref 12–78)
ANION GAP SERPL CALC-SCNC: 5 MMOL/L (ref 2–12)
AST SERPL-CCNC: 24 U/L (ref 15–37)
BILIRUB SERPL-MCNC: 0.7 MG/DL (ref 0.2–1)
BUN SERPL-MCNC: 18 MG/DL (ref 6–20)
BUN/CREAT SERPL: 20 (ref 12–20)
CALCIUM SERPL-MCNC: 9.5 MG/DL (ref 8.5–10.1)
CHLORIDE SERPL-SCNC: 108 MMOL/L (ref 97–108)
CHOLEST SERPL-MCNC: 217 MG/DL
CO2 SERPL-SCNC: 28 MMOL/L (ref 21–32)
CREAT SERPL-MCNC: 0.89 MG/DL (ref 0.55–1.02)
ERYTHROCYTE [DISTWIDTH] IN BLOOD BY AUTOMATED COUNT: 12.2 % (ref 11.5–14.5)
EST. AVERAGE GLUCOSE BLD GHB EST-MCNC: 111 MG/DL
GLOBULIN SER CALC-MCNC: 3.2 G/DL (ref 2–4)
GLUCOSE SERPL-MCNC: 100 MG/DL (ref 65–100)
HBA1C MFR BLD: 5.5 % (ref 4–5.6)
HCT VFR BLD AUTO: 41.8 % (ref 35–47)
HDLC SERPL-MCNC: 76 MG/DL
HDLC SERPL: 2.9 (ref 0–5)
HGB BLD-MCNC: 13 G/DL (ref 11.5–16)
LDLC SERPL CALC-MCNC: 125.2 MG/DL (ref 0–100)
MCH RBC QN AUTO: 29.3 PG (ref 26–34)
MCHC RBC AUTO-ENTMCNC: 31.1 G/DL (ref 30–36.5)
MCV RBC AUTO: 94.1 FL (ref 80–99)
NRBC # BLD: 0 K/UL (ref 0–0.01)
NRBC BLD-RTO: 0 PER 100 WBC
PLATELET # BLD AUTO: 330 K/UL (ref 150–400)
PMV BLD AUTO: 9.9 FL (ref 8.9–12.9)
POTASSIUM SERPL-SCNC: 4.5 MMOL/L (ref 3.5–5.1)
PROT SERPL-MCNC: 7.3 G/DL (ref 6.4–8.2)
RBC # BLD AUTO: 4.44 M/UL (ref 3.8–5.2)
SODIUM SERPL-SCNC: 141 MMOL/L (ref 136–145)
TRIGL SERPL-MCNC: 79 MG/DL
VLDLC SERPL CALC-MCNC: 15.8 MG/DL
WBC # BLD AUTO: 5.5 K/UL (ref 3.6–11)

## 2025-06-04 ENCOUNTER — OFFICE VISIT (OUTPATIENT)
Age: 65
End: 2025-06-04
Payer: COMMERCIAL

## 2025-06-04 VITALS
SYSTOLIC BLOOD PRESSURE: 124 MMHG | RESPIRATION RATE: 16 BRPM | OXYGEN SATURATION: 100 % | BODY MASS INDEX: 24.79 KG/M2 | HEART RATE: 80 BPM | DIASTOLIC BLOOD PRESSURE: 63 MMHG | TEMPERATURE: 98.4 F | WEIGHT: 149 LBS

## 2025-06-04 DIAGNOSIS — Z01.818 PRE-OP EXAM: Primary | ICD-10-CM

## 2025-06-04 DIAGNOSIS — Z01.818 PRE-OP EXAM: ICD-10-CM

## 2025-06-04 PROCEDURE — 93000 ELECTROCARDIOGRAM COMPLETE: CPT | Performed by: NURSE PRACTITIONER

## 2025-06-04 PROCEDURE — 99214 OFFICE O/P EST MOD 30 MIN: CPT | Performed by: NURSE PRACTITIONER

## 2025-06-04 NOTE — PROGRESS NOTES
Date of Exam: 2025    Zahraa Maciel is a 65 y.o. female (:1960) who presents for preoperative evaluation.   Procedure/Surgery:right knee replacement  Date of Procedure/Surgery: 7/3/25  Surgeon: [facility] Facility: [facility]  Primary Physician: Tico Mancia DO  Latex Allergy: No    Current Outpatient Medications   Medication Sig Dispense Refill    Multiple Vitamin (MULTIVITAMIN ADULT PO) Take by mouth      ALPRAZolam (XANAX) 0.25 MG tablet TAKE 1 TABLET BY MOUTH AS NEEDED FOR SLEEP FOR UP TO 30 DAYS. Oral for 20 Days      Cholecalciferol (VITAMIN D3) LIQD 2 drops by NOT APPLICABLE route daily      Ivermectin 1 % CREA 1 Application      metroNIDAZOLE (METROCREAM) 0.75 % cream 1 Application      losartan (COZAAR) 50 MG tablet Take 1 tablet by mouth daily      rosuvastatin (CRESTOR) 40 MG tablet Take 1 tablet by mouth every evening      Fexofenadine-Pseudoephedrine (ALLEGRA-D 24 HOUR PO) Take 1 tablet by mouth daily as needed      Lifitegrast (XIIDRA OP) Apply to eye in the morning and at bedtime      Suzetrigine (JOURNAVX) 50 MG TABS Take 50 mg by mouth every 12 hours Initially, take two 50 mg tablets.  Followed by one 50 mg tablet every 12 hours until pain subsides. (Patient not taking: Reported on 2025) 29 tablet 1    NONFORMULARY Sodium sulfate face wash for rosacea (Patient not taking: Reported on 2025)       No current facility-administered medications for this visit.        Past Medical History:   Diagnosis Date    Arthritis     Hernández's cyst 2025    Rt knee    Complication of anesthesia 2018    Benign Positional Vertigo    High cholesterol     Hypertension         Past Surgical History:   Procedure Laterality Date    COLONOSCOPY  2020    FOOT SURGERY  2018    Plantar facia release, rt foot    KNEE ARTHROSCOPY  2024    Rt knee    KNEE SURGERY      2024    LITHOTRIPSY  2019    ORTHOPEDIC SURGERY      plantar tendon release        Tetanus up

## 2025-06-05 LAB
ALBUMIN SERPL-MCNC: 4.2 G/DL (ref 3.5–5)
ALBUMIN/GLOB SERPL: 1.1 (ref 1.1–2.2)
ALP SERPL-CCNC: 87 U/L (ref 45–117)
ALT SERPL-CCNC: 32 U/L (ref 12–78)
AMORPH CRY URNS QL MICRO: ABNORMAL
ANION GAP SERPL CALC-SCNC: 6 MMOL/L (ref 2–12)
APPEARANCE UR: ABNORMAL
AST SERPL-CCNC: 24 U/L (ref 15–37)
BACTERIA URNS QL MICRO: ABNORMAL /HPF
BASOPHILS # BLD: 0.04 K/UL (ref 0–0.1)
BASOPHILS NFR BLD: 0.7 % (ref 0–1)
BILIRUB SERPL-MCNC: 0.8 MG/DL (ref 0.2–1)
BILIRUB UR QL: NEGATIVE
BUN SERPL-MCNC: 25 MG/DL (ref 6–20)
BUN/CREAT SERPL: 25 (ref 12–20)
CALCIUM SERPL-MCNC: 9.8 MG/DL (ref 8.5–10.1)
CHLORIDE SERPL-SCNC: 107 MMOL/L (ref 97–108)
CO2 SERPL-SCNC: 27 MMOL/L (ref 21–32)
COLOR UR: ABNORMAL
CREAT SERPL-MCNC: 1 MG/DL (ref 0.55–1.02)
DIFFERENTIAL METHOD BLD: NORMAL
EOSINOPHIL # BLD: 0.15 K/UL (ref 0–0.4)
EOSINOPHIL NFR BLD: 2.7 % (ref 0–7)
EPITH CASTS URNS QL MICRO: ABNORMAL /LPF
ERYTHROCYTE [DISTWIDTH] IN BLOOD BY AUTOMATED COUNT: 12.4 % (ref 11.5–14.5)
EST. AVERAGE GLUCOSE BLD GHB EST-MCNC: 108 MG/DL
GLOBULIN SER CALC-MCNC: 3.8 G/DL (ref 2–4)
GLUCOSE SERPL-MCNC: 92 MG/DL (ref 65–100)
GLUCOSE UR STRIP.AUTO-MCNC: NEGATIVE MG/DL
HBA1C MFR BLD: 5.4 % (ref 4–5.6)
HCT VFR BLD AUTO: 42.1 % (ref 35–47)
HGB BLD-MCNC: 12.8 G/DL (ref 11.5–16)
HGB UR QL STRIP: ABNORMAL
IMM GRANULOCYTES # BLD AUTO: 0.01 K/UL (ref 0–0.04)
IMM GRANULOCYTES NFR BLD AUTO: 0.2 % (ref 0–0.5)
KETONES UR QL STRIP.AUTO: NEGATIVE MG/DL
LEUKOCYTE ESTERASE UR QL STRIP.AUTO: NEGATIVE
LYMPHOCYTES # BLD: 0.99 K/UL (ref 0.8–3.5)
LYMPHOCYTES NFR BLD: 18.1 % (ref 12–49)
MCH RBC QN AUTO: 28.9 PG (ref 26–34)
MCHC RBC AUTO-ENTMCNC: 30.4 G/DL (ref 30–36.5)
MCV RBC AUTO: 95 FL (ref 80–99)
MONOCYTES # BLD: 0.49 K/UL (ref 0–1)
MONOCYTES NFR BLD: 9 % (ref 5–13)
NEUTS SEG # BLD: 3.79 K/UL (ref 1.8–8)
NEUTS SEG NFR BLD: 69.3 % (ref 32–75)
NITRITE UR QL STRIP.AUTO: NEGATIVE
NRBC # BLD: 0 K/UL (ref 0–0.01)
NRBC BLD-RTO: 0 PER 100 WBC
PH UR STRIP: 5 (ref 5–8)
PLATELET # BLD AUTO: 306 K/UL (ref 150–400)
PMV BLD AUTO: 10 FL (ref 8.9–12.9)
POTASSIUM SERPL-SCNC: 4.1 MMOL/L (ref 3.5–5.1)
PROT SERPL-MCNC: 8 G/DL (ref 6.4–8.2)
PROT UR STRIP-MCNC: 100 MG/DL
RBC # BLD AUTO: 4.43 M/UL (ref 3.8–5.2)
RBC #/AREA URNS HPF: ABNORMAL /HPF (ref 0–5)
SODIUM SERPL-SCNC: 140 MMOL/L (ref 136–145)
SP GR UR REFRACTOMETRY: 1.02 (ref 1–1.03)
URINE CULTURE IF INDICATED: ABNORMAL
UROBILINOGEN UR QL STRIP.AUTO: 0.2 EU/DL (ref 0.2–1)
WBC # BLD AUTO: 5.5 K/UL (ref 3.6–11)
WBC URNS QL MICRO: ABNORMAL /HPF (ref 0–4)

## 2025-06-06 ENCOUNTER — RESULTS FOLLOW-UP (OUTPATIENT)
Age: 65
End: 2025-06-06

## 2025-06-27 ENCOUNTER — PATIENT MESSAGE (OUTPATIENT)
Age: 65
End: 2025-06-27

## 2025-06-27 NOTE — TELEPHONE ENCOUNTER
Last office visit 6/4/25  Next Appt  With Internal Medicine (Tico Mancia DO)  08/27/2025 at 9:30 AM        Neither of these medications have been filled with this office previously

## 2025-06-30 RX ORDER — ROSUVASTATIN CALCIUM 40 MG/1
40 TABLET, COATED ORAL EVERY EVENING
Qty: 90 TABLET | Refills: 3 | Status: SHIPPED | OUTPATIENT
Start: 2025-06-30

## 2025-06-30 RX ORDER — LOSARTAN POTASSIUM 50 MG/1
50 TABLET ORAL DAILY
Qty: 90 TABLET | Refills: 3 | Status: SHIPPED | OUTPATIENT
Start: 2025-06-30

## 2025-08-27 ENCOUNTER — OFFICE VISIT (OUTPATIENT)
Age: 65
End: 2025-08-27
Payer: COMMERCIAL

## 2025-08-27 VITALS
HEIGHT: 64 IN | BODY MASS INDEX: 24.92 KG/M2 | TEMPERATURE: 98 F | WEIGHT: 146 LBS | SYSTOLIC BLOOD PRESSURE: 142 MMHG | HEART RATE: 80 BPM | OXYGEN SATURATION: 98 % | RESPIRATION RATE: 16 BRPM | DIASTOLIC BLOOD PRESSURE: 88 MMHG

## 2025-08-27 DIAGNOSIS — F51.01 PRIMARY INSOMNIA: ICD-10-CM

## 2025-08-27 DIAGNOSIS — E78.5 HYPERLIPIDEMIA, UNSPECIFIED HYPERLIPIDEMIA TYPE: ICD-10-CM

## 2025-08-27 DIAGNOSIS — Z00.00 ENCOUNTER FOR WELL ADULT EXAM WITHOUT ABNORMAL FINDINGS: Primary | ICD-10-CM

## 2025-08-27 DIAGNOSIS — I10 PRIMARY HYPERTENSION: ICD-10-CM

## 2025-08-27 PROBLEM — N39.0 UTI (URINARY TRACT INFECTION): Status: RESOLVED | Noted: 2019-05-23 | Resolved: 2025-08-27

## 2025-08-27 PROBLEM — N23 RENAL COLIC: Status: RESOLVED | Noted: 2019-05-22 | Resolved: 2025-08-27

## 2025-08-27 PROCEDURE — 3077F SYST BP >= 140 MM HG: CPT | Performed by: STUDENT IN AN ORGANIZED HEALTH CARE EDUCATION/TRAINING PROGRAM

## 2025-08-27 PROCEDURE — 99396 PREV VISIT EST AGE 40-64: CPT | Performed by: STUDENT IN AN ORGANIZED HEALTH CARE EDUCATION/TRAINING PROGRAM

## 2025-08-27 PROCEDURE — 3079F DIAST BP 80-89 MM HG: CPT | Performed by: STUDENT IN AN ORGANIZED HEALTH CARE EDUCATION/TRAINING PROGRAM

## 2025-08-27 PROCEDURE — 99213 OFFICE O/P EST LOW 20 MIN: CPT | Performed by: STUDENT IN AN ORGANIZED HEALTH CARE EDUCATION/TRAINING PROGRAM

## 2025-08-27 RX ORDER — IVERMECTIN 10 MG/G
CREAM TOPICAL
COMMUNITY

## 2025-08-27 RX ORDER — MELOXICAM 7.5 MG/1
7.5 TABLET ORAL DAILY
COMMUNITY

## 2025-08-27 ASSESSMENT — ENCOUNTER SYMPTOMS
CONSTIPATION: 0
BLOOD IN STOOL: 0
DIARRHEA: 0
NAUSEA: 0
SHORTNESS OF BREATH: 0
COUGH: 0
VOMITING: 0
ABDOMINAL PAIN: 0

## (undated) DEVICE — TIDISHIELD UROLOGY DRAIN BAGS FROSTY VINYL STERILE FITS SIEMENS UROSKOP ACCESS 20 PER CASE: Brand: TIDISHIELD

## (undated) DEVICE — INFECTION CONTROL KIT SYS

## (undated) DEVICE — GOWN,SIRUS,FABRNF,XL,20/CS: Brand: MEDLINE

## (undated) DEVICE — Z DISCONTINUEDSOLUTION PREP 2OZ 10% POVIDONE IOD SCR CAP BTL

## (undated) DEVICE — KENDALL SCD EXPRESS SLEEVES, KNEE LENGTH, MEDIUM: Brand: KENDALL SCD

## (undated) DEVICE — PACK,CYSTOSCOPY,PK III,SIRUS: Brand: MEDLINE

## (undated) DEVICE — STERILE POLYISOPRENE POWDER-FREE SURGICAL GLOVES WITH EMOLLIENT COATING: Brand: PROTEXIS

## (undated) DEVICE — GDWIRE URET STR STD .035X150 -- ZIPWIRE STD

## (undated) DEVICE — OPEN-END URETERAL CATHETER: Brand: COOK

## (undated) DEVICE — SOLUTION IRRIGATION H2O 0797305] ICU MEDICAL INC]

## (undated) DEVICE — BAG DRAIN URIN 2000ML LF STRL -- CONVERT TO ITEM 363123

## (undated) DEVICE — CATHETER F BLLN 5CC 16FR 2 W HYDRGEL COAT LESS TRAUM LUB

## (undated) DEVICE — CYSTO/BLADDER IRRIGATION SET, REGULATING CLAMP

## (undated) DEVICE — MARKER,SKIN,WI/RULER AND LABELS: Brand: MEDLINE